# Patient Record
Sex: MALE | Race: WHITE | NOT HISPANIC OR LATINO | Employment: OTHER | URBAN - METROPOLITAN AREA
[De-identification: names, ages, dates, MRNs, and addresses within clinical notes are randomized per-mention and may not be internally consistent; named-entity substitution may affect disease eponyms.]

---

## 2017-03-21 ENCOUNTER — APPOINTMENT (OUTPATIENT)
Dept: BEHAVIORAL HEALTH | Facility: PHYSICIAN GROUP | Age: 62
End: 2017-03-21
Payer: COMMERCIAL

## 2017-03-22 RX ORDER — BUPROPION HYDROCHLORIDE 75 MG/1
TABLET ORAL
Qty: 180 TAB | Refills: 0 | Status: SHIPPED | OUTPATIENT
Start: 2017-03-22 | End: 2017-06-01 | Stop reason: SDUPTHER

## 2017-03-24 ENCOUNTER — OFFICE VISIT (OUTPATIENT)
Dept: BEHAVIORAL HEALTH | Facility: PHYSICIAN GROUP | Age: 62
End: 2017-03-24
Payer: COMMERCIAL

## 2017-03-24 VITALS
SYSTOLIC BLOOD PRESSURE: 150 MMHG | HEART RATE: 76 BPM | DIASTOLIC BLOOD PRESSURE: 105 MMHG | WEIGHT: 230 LBS | HEIGHT: 70 IN | BODY MASS INDEX: 32.93 KG/M2

## 2017-03-24 DIAGNOSIS — F33.42 MDD (MAJOR DEPRESSIVE DISORDER), RECURRENT, IN FULL REMISSION (HCC): ICD-10-CM

## 2017-03-24 DIAGNOSIS — F90.0 ADHD, PREDOMINANTLY INATTENTIVE TYPE: ICD-10-CM

## 2017-03-24 PROCEDURE — 99213 OFFICE O/P EST LOW 20 MIN: CPT | Performed by: PSYCHIATRY & NEUROLOGY

## 2017-03-24 RX ORDER — METHYLPHENIDATE HYDROCHLORIDE 20 MG/1
20 TABLET ORAL 3 TIMES DAILY
Qty: 90 TAB | Refills: 0 | Status: SHIPPED | OUTPATIENT
Start: 2017-03-24 | End: 2017-06-23 | Stop reason: SDUPTHER

## 2017-03-24 RX ORDER — METHYLPHENIDATE HYDROCHLORIDE 20 MG/1
20 TABLET ORAL 3 TIMES DAILY
Qty: 90 TAB | Refills: 0 | Status: SHIPPED | OUTPATIENT
Start: 2017-05-22 | End: 2017-06-23 | Stop reason: SDUPTHER

## 2017-03-24 RX ORDER — HYDROCODONE BITARTRATE AND ACETAMINOPHEN 5; 325 MG/1; MG/1
TABLET ORAL
Refills: 0 | COMMUNITY
Start: 2017-02-22 | End: 2017-06-23

## 2017-03-24 RX ORDER — BUPROPION HYDROCHLORIDE 75 MG/1
75 TABLET ORAL DAILY
Qty: 90 TAB | Refills: 0
Start: 2017-03-24 | End: 2017-06-01

## 2017-03-24 RX ORDER — METHYLPHENIDATE HYDROCHLORIDE 20 MG/1
20 TABLET ORAL 3 TIMES DAILY
Qty: 90 TAB | Refills: 0 | Status: SHIPPED | OUTPATIENT
Start: 2017-04-23 | End: 2017-06-23 | Stop reason: SDUPTHER

## 2017-03-24 RX ORDER — VENLAFAXINE HYDROCHLORIDE 75 MG/1
75 CAPSULE, EXTENDED RELEASE ORAL DAILY
Qty: 90 CAP | Refills: 0 | Status: SHIPPED | OUTPATIENT
Start: 2017-03-24 | End: 2017-06-23 | Stop reason: SDUPTHER

## 2017-03-24 NOTE — PROGRESS NOTES
PSYCHIATRY FOLLOW-UP NOTE      Chief Complaint   Patient presents with   • Follow-Up     ADHD, depression         History Of Present Illness:  Chris Garvin is a 61 y.o. old male with ADHD, major depressive disorder comes in today for follow up, was last seen 3 months ago. He reports doing good since his last visit here. He was in the ski accident and injured his left leg again. He was seen by his orthopedic physician and is working with him for his left fibular fracture. He also filed a workers comp case and has been off work since the middle of January because of this injury. He feels that because of this injury he is not being treated well at the job and he has filed a complaint with the HR department. He has been doing good in terms of his depressive symptoms. His dose of Wellbutrin was tapered down to 75 mg twice daily and he has not noticed a change in his symptoms. He feels that even with the lower dose he was able to handle the stressors around him in a pretty good way. Denies feeling irritable or agitated. His wife is also dealing with some physical health problems and he is trying to stay strong for her. Sleep and appetite good. Continues to be compliant with Ritalin and feels that it really helps his ability to focus and concentrate. Denies any other stressors at this time.    Social History:    x 2,  x 1.  to his second wife for 7 years. No biological kids but has 2 step kids. Lives in [place] with his wife. 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    Substance Use:  Denies recent use of alcohol, nicotine or illicit drugs. Has been sober from alcohol for 15 years.     Medications:  Current Outpatient Prescriptions   Medication Sig Dispense Refill   • methylphenidate (RITALIN) 20 MG tablet Take 1 Tab by mouth 3 times a day. 90 Tab 0   • [START ON 4/23/2017] methylphenidate (RITALIN) 20 MG tablet Take 1 Tab by mouth 3 times a day. 90 Tab 0   • [START  "ON 5/22/2017] methylphenidate (RITALIN) 20 MG tablet Take 1 Tab by mouth 3 times a day. 90 Tab 0   • buPROPion (WELLBUTRIN) 75 MG Tab Take 1 Tab by mouth every day. 90 Tab 0   • venlafaxine XR (EFFEXOR XR) 75 MG CAPSULE SR 24 HR Take 1 Cap by mouth every day. 90 Cap 0   • hydrocodone-acetaminophen (NORCO) 5-325 MG Tab per tablet TAKE 1 TABLET BY MOUTH EVERY 8 TO 12 HOURS AS NEEDED FOR PAIN  0   • Non Formulary Request Inhale 2 Puffs by mouth 2 Times a Day. Indications: Dulera (not carried by [facility])     • ibuprofen (MOTRIN) 800 MG TABS Take 1 Tab by mouth 3 times a day, with meals. 90 Tab 1   • lidocaine (LIDODERM) 5 % PTCH Apply 1 Patch to skin as directed See Admin Instructions. 30 Patch 1     No current facility-administered medications for this visit.       Review Of Systems:    Constitutional - Negative for fatigue  Respiratory - Negative for shortness of breath, cough  CVS - Negative for chest pain, palpitations  GI - Negative for nausea, vomiting, abdominal pain, diarrhea, constipation  Musculoskeletal - Positive for left leg pain  Neurological - Negative for headaches  Psychiatric - Negative for depression, anxiety    Physical Examination:  Vital signs: /105 mmHg  Pulse 76  Ht 1.778 m (5' 10\")  Wt 104.327 kg (230 lb)  BMI 33.00 kg/m2    Musculoskeletal: Limping gait. No abnormal movements.     Mental Status Evaluation:   General: Elderly white male, dressed in casual attire, good grooming and hygiene, in no apparent distress, calm and cooperative, good eye contact, no psychomotor agitation or retardation  Orientation: Alert and oriented to person, place and time  Recent and remote memory: Grossly intact  Attention span and concentration: Grossly intact  Speech: Spontaneous, normal rate, rhythm and tone  Thought Process: Linear, logical and goal directed  Thought Content: Denies suicidal or homicidal ideations, intent or plan  Perception: Denies auditory or visual hallucinations. No delusions " "noted  Associations: Intact  Language: Appropriate  Fund of knowledge and vocabulary: Grossly adequate  Mood: \"am good\"  Affect: Euthymic, mood congruent  Insight: Good  Judgment: Good      Impression:  1. ADHD, inattentive type  2. Major depressive disorder, recurrent, in full remission    Medical Records/Labs/Diagnostic Tests Reviewed:  [place] [facility] records - appropriate refills, no abuse suspected     Plan:  1. Decrease Wellbutrin to 75 mg in the morning for depression given stable mood symptoms. Plan is to eventually taper off Wellbutrin and continue him on only one anti depressant.  2. Continue Effexor XR 75mg qam for depression  3. Continue Ritalin 20mg tid for ADHD. Provided 3 scripts for 90 tabs each.  4. BP elevated again today. He has an appointment scheduled with his primary care physician for next month to discuss possible medications for hypertension.    Return to clinic in 3 months or sooner if symptoms worsen    The proposed treatment plan was discussed with the patient who was provided the opportunity to ask questions and make suggestions regarding alternative treatment. Patient verbalized understanding and expressed agreement with the plan.     Karo Romano M.D.  03/24/2017    This note was created using voice recognition software (Dragon). The accuracy of the dictation is limited by the abilities of the software. I have reviewed the note prior to signing, however some errors in grammar and context are still possible. If you have any questions related to this note please do not hesitate to contact our office.         "

## 2017-06-01 RX ORDER — BUPROPION HYDROCHLORIDE 75 MG/1
75 TABLET ORAL EVERY MORNING
Qty: 30 TAB | Refills: 0 | Status: SHIPPED | OUTPATIENT
Start: 2017-06-01 | End: 2017-06-23

## 2017-06-23 ENCOUNTER — OFFICE VISIT (OUTPATIENT)
Dept: BEHAVIORAL HEALTH | Facility: PHYSICIAN GROUP | Age: 62
End: 2017-06-23
Payer: COMMERCIAL

## 2017-06-23 VITALS
HEART RATE: 88 BPM | SYSTOLIC BLOOD PRESSURE: 131 MMHG | BODY MASS INDEX: 32.93 KG/M2 | WEIGHT: 230 LBS | DIASTOLIC BLOOD PRESSURE: 91 MMHG | HEIGHT: 70 IN

## 2017-06-23 DIAGNOSIS — F90.0 ADHD, PREDOMINANTLY INATTENTIVE TYPE: ICD-10-CM

## 2017-06-23 DIAGNOSIS — F33.42 MDD (MAJOR DEPRESSIVE DISORDER), RECURRENT, IN FULL REMISSION (HCC): ICD-10-CM

## 2017-06-23 PROCEDURE — 99213 OFFICE O/P EST LOW 20 MIN: CPT | Performed by: PSYCHIATRY & NEUROLOGY

## 2017-06-23 RX ORDER — VENLAFAXINE HYDROCHLORIDE 75 MG/1
75 CAPSULE, EXTENDED RELEASE ORAL DAILY
Qty: 90 CAP | Refills: 0 | Status: SHIPPED | OUTPATIENT
Start: 2017-06-23 | End: 2017-09-25 | Stop reason: SDUPTHER

## 2017-06-23 RX ORDER — METHYLPHENIDATE HYDROCHLORIDE 20 MG/1
20 TABLET ORAL 3 TIMES DAILY
Qty: 90 TAB | Refills: 0 | Status: SHIPPED | OUTPATIENT
Start: 2017-08-21 | End: 2017-09-19 | Stop reason: SDUPTHER

## 2017-06-23 RX ORDER — METHYLPHENIDATE HYDROCHLORIDE 20 MG/1
20 TABLET ORAL 3 TIMES DAILY
Qty: 90 TAB | Refills: 0 | Status: SHIPPED | OUTPATIENT
Start: 2017-07-22 | End: 2017-09-19

## 2017-06-23 RX ORDER — METHYLPHENIDATE HYDROCHLORIDE 20 MG/1
20 TABLET ORAL 3 TIMES DAILY
Qty: 90 TAB | Refills: 0 | Status: SHIPPED | OUTPATIENT
Start: 2017-06-23 | End: 2017-09-19

## 2017-06-23 RX ORDER — LISINOPRIL 5 MG/1
5 TABLET ORAL DAILY
Refills: 5 | COMMUNITY
Start: 2017-04-11 | End: 2017-09-25

## 2017-06-23 NOTE — PROGRESS NOTES
PSYCHIATRY FOLLOW-UP NOTE      Chief Complaint   Patient presents with   • Follow-Up     ADHD, depression         History Of Present Illness:  Chris Garvin is a 61 y.o. old male with ADHD, major depressive disorder comes in today for follow up, was last seen 3 months ago. He has been doing good since his last visit here. He was able to cut back on his dose of Wellbutrin to 75 mg and has not noticed any significant changes in his mood. He states that he was unable to get a refill on his Effexor for some reason and he was out of it for about 45 days and he definitely noticed a decline in his mood symptoms but he is back taking it again and feels good in regards to his depression. Continues to be compliant with Ritalin as well which she feels really helps his focus and energy. Sleep and appetite have been good. Denies anhedonia. He has noticed some increased irritability which could be because of changes in his work schedule. 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 Denies any new stressors. Denies any side effects from his current psychotropic medications. He also saw his primary care physician and was started on lisinopril for his hypertension but he stopped taking it a few weeks ago because of dizziness and cough. For now he is monitoring his blood pressure daily and has an appointment to discuss this further in a few weeks with his primary care physician.    Social History:    x 2,  x 1.  to his second wife for over 7 years. No biological kids but has 2 step kids. Lives in [place] with his wife. 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    Substance Use:  Denies recent use of alcohol, nicotine or illicit drugs. Has been sober from alcohol for 16+ years.     Medications:  Current Outpatient Prescriptions   Medication Sig Dispense Refill   • methylphenidate (RITALIN) 20 MG tablet Take 1 Tab by mouth 3  "times a day. 90 Tab 0   • [START ON 7/22/2017] methylphenidate (RITALIN) 20 MG tablet Take 1 Tab by mouth 3 times a day. 90 Tab 0   • [START ON 8/21/2017] methylphenidate (RITALIN) 20 MG tablet Take 1 Tab by mouth 3 times a day. 90 Tab 0   • venlafaxine XR (EFFEXOR XR) 75 MG CAPSULE SR 24 HR Take 1 Cap by mouth every day. 90 Cap 0   • lisinopril (PRINIVIL) 5 MG Tab Take 5 mg by mouth every day.  5   • Non Formulary Request Inhale 2 Puffs by mouth 2 Times a Day. Indications: Dulera (not carried by [facility])     • ibuprofen (MOTRIN) 800 MG TABS Take 1 Tab by mouth 3 times a day, with meals. 90 Tab 1   • lidocaine (LIDODERM) 5 % PTCH Apply 1 Patch to skin as directed See Admin Instructions. 30 Patch 1     No current facility-administered medications for this visit.       Review Of Systems:    Constitutional - Negative for fatigue  Respiratory - Negative for shortness of breath, cough  CVS - Negative for chest pain, palpitations  GI - Negative for nausea, vomiting, abdominal pain, diarrhea, constipation  Musculoskeletal - Negative for back pain  Neurological - Negative for headaches  Psychiatric - Negative for depression, anxiety    Physical Examination:  Vital signs: /91 mmHg  Pulse 88  Ht 1.778 m (5' 10\")  Wt 104.327 kg (230 lb)  BMI 33.00 kg/m2    Musculoskeletal: Limping gait. No abnormal movements.     Mental Status Evaluation:   General: Elderly white male, dressed in casual attire, good grooming and hygiene, in no apparent distress, calm and cooperative, good eye contact, no psychomotor agitation or retardation  Orientation: Alert and oriented to person, place and time  Recent and remote memory: Grossly intact  Attention span and concentration: Grossly intact  Speech: Spontaneous, normal rate, rhythm and tone  Thought Process: Linear, logical and goal directed  Thought Content: Denies suicidal or homicidal ideations, intent or plan  Perception: Denies auditory or visual hallucinations. No delusions " "noted  Associations: Intact  Language: Appropriate  Fund of knowledge and vocabulary: Grossly adequate  Mood: \"am good\"  Affect: Euthymic, mood congruent  Insight: Good  Judgment: Good      Impression:  1. ADHD, inattentive type  2. Major depressive disorder, recurrent, in full remission    Medical Records/Labs/Diagnostic Tests Reviewed:  [place]  records - appropriate refills, no abuse suspected     Plan:  1. Discontinue Wellbutrin given stable mood symptoms. Have asked him to give my clinical call back in a month to give a brief update on his mood symptoms without the Wellbutrin.  2. Continue Effexor XR 75 mg in the morning for depression  3. Continue Ritalin 20 mg three times daily for ADHD. Provided 3 scripts for 90 tabs each.    Return to clinic in 3 months or sooner if symptoms worsen    The proposed treatment plan was discussed with the patient who was provided the opportunity to ask questions and make suggestions regarding alternative treatment. Patient verbalized understanding and expressed agreement with the plan.     Karo Romano M.D.  06/23/2017    This note was created using voice recognition software (Dragon). The accuracy of the dictation is limited by the abilities of the software. I have reviewed the note prior to signing, however some errors in grammar and context are still possible. If you have any questions related to this note please do not hesitate to contact our office.         "

## 2017-09-19 RX ORDER — METHYLPHENIDATE HYDROCHLORIDE 20 MG/1
20 TABLET ORAL 3 TIMES DAILY
Qty: 90 TAB | Refills: 0 | Status: SHIPPED | OUTPATIENT
Start: 2017-09-19 | End: 2017-09-25 | Stop reason: SDUPTHER

## 2017-09-25 ENCOUNTER — OFFICE VISIT (OUTPATIENT)
Dept: BEHAVIORAL HEALTH | Facility: PHYSICIAN GROUP | Age: 62
End: 2017-09-25
Payer: COMMERCIAL

## 2017-09-25 VITALS
BODY MASS INDEX: 33.64 KG/M2 | HEIGHT: 70 IN | HEART RATE: 84 BPM | SYSTOLIC BLOOD PRESSURE: 126 MMHG | DIASTOLIC BLOOD PRESSURE: 92 MMHG | WEIGHT: 235 LBS

## 2017-09-25 DIAGNOSIS — F90.0 ADHD, PREDOMINANTLY INATTENTIVE TYPE: ICD-10-CM

## 2017-09-25 DIAGNOSIS — F33.42 MDD (MAJOR DEPRESSIVE DISORDER), RECURRENT, IN FULL REMISSION (HCC): ICD-10-CM

## 2017-09-25 PROCEDURE — 99214 OFFICE O/P EST MOD 30 MIN: CPT | Performed by: PSYCHIATRY & NEUROLOGY

## 2017-09-25 RX ORDER — METHYLPHENIDATE HYDROCHLORIDE 20 MG/1
20 TABLET ORAL 3 TIMES DAILY
Qty: 90 TAB | Refills: 0 | Status: SHIPPED | OUTPATIENT
Start: 2017-12-15 | End: 2018-01-22

## 2017-09-25 RX ORDER — METHYLPHENIDATE HYDROCHLORIDE 20 MG/1
20 TABLET ORAL 3 TIMES DAILY
Qty: 90 TAB | Refills: 0 | Status: SHIPPED | OUTPATIENT
Start: 2017-11-17 | End: 2018-01-22

## 2017-09-25 RX ORDER — PANTOPRAZOLE SODIUM 40 MG/1
40 TABLET, DELAYED RELEASE ORAL EVERY MORNING
Refills: 5 | COMMUNITY
Start: 2017-09-09

## 2017-09-25 RX ORDER — METHYLPHENIDATE HYDROCHLORIDE 20 MG/1
20 TABLET ORAL 3 TIMES DAILY
Qty: 90 TAB | Refills: 0 | Status: SHIPPED | OUTPATIENT
Start: 2017-10-19 | End: 2018-01-22 | Stop reason: SDUPTHER

## 2017-09-25 RX ORDER — VENLAFAXINE HYDROCHLORIDE 75 MG/1
75 CAPSULE, EXTENDED RELEASE ORAL DAILY
Qty: 90 CAP | Refills: 1 | Status: SHIPPED | OUTPATIENT
Start: 2017-09-25 | End: 2018-01-05 | Stop reason: SDUPTHER

## 2017-09-25 ASSESSMENT — PATIENT HEALTH QUESTIONNAIRE - PHQ9
7. TROUBLE CONCENTRATING ON THINGS, SUCH AS READING THE NEWSPAPER OR WATCHING TELEVISION: 0
5. POOR APPETITE OR OVEREATING: 0
9. THOUGHTS THAT YOU WOULD BE BETTER OFF DEAD, OR OF HURTING YOURSELF: 0
8. MOVING OR SPEAKING SO SLOWLY THAT OTHER PEOPLE COULD HAVE NOTICED. OR THE OPPOSITE, BEING SO FIGETY OR RESTLESS THAT YOU HAVE BEEN MOVING AROUND A LOT MORE THAN USUAL: 0
SUM OF ALL RESPONSES TO PHQ QUESTIONS 1-9: 0
1. LITTLE INTEREST OR PLEASURE IN DOING THINGS: 0
3. TROUBLE FALLING OR STAYING ASLEEP OR SLEEPING TOO MUCH: 0
4. FEELING TIRED OR HAVING LITTLE ENERGY: 0
6. FEELING BAD ABOUT YOURSELF - OR THAT YOU ARE A FAILURE OR HAVE LET YOURSELF OR YOUR FAMILY DOWN: 0
2. FEELING DOWN, DEPRESSED, IRRITABLE, OR HOPELESS: 0
SUM OF ALL RESPONSES TO PHQ9 QUESTIONS 1 AND 2: 0

## 2017-09-25 NOTE — PROGRESS NOTES
PSYCHIATRY FOLLOW-UP NOTE      Chief Complaint   Patient presents with   • Follow-Up     ADHD, depression         History Of Present Illness:  Chris Garvin is a 62 y.o. old male with ADHD, major depressive disorder, sleep apnea on ASHLEY comes in today for follow up, was last seen 3 months ago. He reports doing fine since his last visit here. He has been off Wellbutrin for about 3 months and has been doing good in regards to his depression. He has been compliant with his Effexor and is doing good on it. He has been having some occasional irritability secondary psychosocial stressors. He had been working at a local [employer] for the last 5 years but had some problems over there since his injury last winter. He felt that he was discriminated and plans on seeing a  to go over his options. He feels disappointed by the way he was treated by the HR department and his supervisor and does not want anybody else to go through the same. He has a meeting tomorrow with a  and is feeling anxious about it. He feels that he is doing the right thing and is not channeling his irritability and a negative sense. Denies any physical or verbal outbursts of anger. He continues to be physically active and has been working out 4-5 times a week. He is not sure what he plans on doing this winter but is looking into his options. He and his wife are stable financially so he's okay with not working this winter. Sleep and appetite have been good. Denies any side effects from Effexor or Ritalin. He has been taking his Ritalin 3 times a day and endorses benefit on his focus and concentration. Denies any thoughts of wanting to hurt himself or others.    Social History:    x 2,  x 1.  to his second wife for over 7 years. No biological kids but has 2 step kids. Lives in [place] with his wife. Retired  and works as a  at a local [employer].    Substance Use:  Alcohol - Denies, sober  "from alcohol for 16+ years  Nicotine - Denies  Illicit drugs - Denies    Medications:  Current Outpatient Prescriptions   Medication Sig Dispense Refill   • [START ON 10/19/2017] methylphenidate (RITALIN) 20 MG tablet Take 1 Tab by mouth 3 times a day. 90 Tab 0   • [START ON 11/17/2017] methylphenidate (RITALIN) 20 MG tablet Take 1 Tab by mouth 3 times a day. 90 Tab 0   • [START ON 12/15/2017] methylphenidate (RITALIN) 20 MG tablet Take 1 Tab by mouth 3 times a day. 90 Tab 0   • venlafaxine XR (EFFEXOR XR) 75 MG CAPSULE SR 24 HR Take 1 Cap by mouth every day. 90 Cap 1   • pantoprazole (PROTONIX) 40 MG Tablet Delayed Response Take 40 mg by mouth every morning.  5   • Non Formulary Request Inhale 2 Puffs by mouth 2 Times a Day. Indications: Dulera (not carried by [facility])     • ibuprofen (MOTRIN) 800 MG TABS Take 1 Tab by mouth 3 times a day, with meals. 90 Tab 1   • lidocaine (LIDODERM) 5 % PTCH Apply 1 Patch to skin as directed See Admin Instructions. 30 Patch 1     No current facility-administered medications for this visit.        Review Of Systems:    Constitutional - Negative for fatigue  Respiratory - Negative for shortness of breath, cough  CVS - Negative for chest pain, palpitations  GI - Negative for nausea, vomiting, abdominal pain, diarrhea, constipation  Musculoskeletal - Negative for back pain. Positive for left foot pain  Neurological - Negative for headaches  Psychiatric - Negative for depression, anxiety. Positive for occasional irritability     Physical Examination:  Vital signs: /92   Pulse 84   Ht 1.778 m (5' 10\")   Wt 106.6 kg (235 lb)   BMI 33.72 kg/m²     Musculoskeletal: Limping gait. No abnormal movements.     Mental Status Evaluation:   General: Elderly white male, dressed in casual attire, good grooming and hygiene, in no apparent distress, calm and cooperative, good eye contact, no psychomotor agitation or retardation  Orientation: Alert and oriented to person, place and " "time  Recent and remote memory: Grossly intact  Attention span and concentration: Grossly intact  Speech: Spontaneous, normal rate, rhythm and tone  Thought Process: Linear, logical and goal directed  Thought Content: Denies suicidal or homicidal ideations, intent or plan  Perception: Denies auditory or visual hallucinations. No delusions noted  Associations: Intact  Language: Appropriate  Fund of knowledge and vocabulary: Grossly adequate  Mood: \"am okay\"  Affect: Euthymic, mood congruent  Insight: Good  Judgment: Good      Impression:  1. ADHD, inattentive type  2. Major depressive disorder, recurrent, in full remission    Medical Records/Labs/Diagnostic Tests Reviewed:  [facility] records - appropriate refills, no abuse suspected     Plan:  1. Continue Effexor XR 75 mg in the morning for depression  2. Continue Ritalin 20 mg three times daily for ADHD. Provided 3 scripts for 90 tabs each.   - Weight stable   - BP and pulse stable    Return to clinic in 3 months or sooner if symptoms worsen    The proposed treatment plan was discussed with the patient who was provided the opportunity to ask questions and make suggestions regarding alternative treatment. Patient verbalized understanding and expressed agreement with the plan.     Total face-to-face time: 30 minutes  More than 50% of face-to-face time was spent in counseling and coordinating care. Discussed his recent increase in psychosocial stressors and ways to handle them.    Karo Romano M.D.  09/25/17    This note was created using voice recognition software (Dragon). The accuracy of the dictation is limited by the abilities of the software. I have reviewed the note prior to signing, however some errors in grammar and context are still possible. If you have any questions related to this note please do not hesitate to contact our office.         "

## 2018-01-22 DIAGNOSIS — F90.0 ADHD (ATTENTION DEFICIT HYPERACTIVITY DISORDER), INATTENTIVE TYPE: ICD-10-CM

## 2018-01-22 RX ORDER — METHYLPHENIDATE HYDROCHLORIDE 20 MG/1
20 TABLET ORAL 3 TIMES DAILY
Qty: 90 TAB | Refills: 0 | Status: SHIPPED | OUTPATIENT
Start: 2018-01-22 | End: 2018-02-09 | Stop reason: SDUPTHER

## 2018-02-09 ENCOUNTER — OFFICE VISIT (OUTPATIENT)
Dept: BEHAVIORAL HEALTH | Facility: PHYSICIAN GROUP | Age: 63
End: 2018-02-09
Payer: COMMERCIAL

## 2018-02-09 VITALS
WEIGHT: 235 LBS | HEIGHT: 70 IN | HEART RATE: 66 BPM | DIASTOLIC BLOOD PRESSURE: 87 MMHG | SYSTOLIC BLOOD PRESSURE: 116 MMHG | BODY MASS INDEX: 33.64 KG/M2

## 2018-02-09 DIAGNOSIS — F33.42 MDD (MAJOR DEPRESSIVE DISORDER), RECURRENT, IN FULL REMISSION (HCC): ICD-10-CM

## 2018-02-09 DIAGNOSIS — F90.0 ADHD (ATTENTION DEFICIT HYPERACTIVITY DISORDER), INATTENTIVE TYPE: ICD-10-CM

## 2018-02-09 PROCEDURE — 99213 OFFICE O/P EST LOW 20 MIN: CPT | Performed by: PSYCHIATRY & NEUROLOGY

## 2018-02-09 RX ORDER — METHYLPHENIDATE HYDROCHLORIDE 20 MG/1
20 TABLET ORAL 3 TIMES DAILY
Qty: 90 TAB | Refills: 0 | Status: SHIPPED | OUTPATIENT
Start: 2018-04-21 | End: 2018-05-14 | Stop reason: SDUPTHER

## 2018-02-09 RX ORDER — VENLAFAXINE HYDROCHLORIDE 75 MG/1
75 CAPSULE, EXTENDED RELEASE ORAL DAILY
Qty: 30 CAP | Refills: 2 | Status: SHIPPED | OUTPATIENT
Start: 2018-02-09 | End: 2018-04-03

## 2018-02-09 RX ORDER — METHYLPHENIDATE HYDROCHLORIDE 20 MG/1
20 TABLET ORAL 3 TIMES DAILY
Qty: 90 TAB | Refills: 0 | Status: SHIPPED | OUTPATIENT
Start: 2018-03-23 | End: 2018-04-22

## 2018-02-09 RX ORDER — METHYLPHENIDATE HYDROCHLORIDE 20 MG/1
20 TABLET ORAL 3 TIMES DAILY
Qty: 90 TAB | Refills: 0 | Status: SHIPPED | OUTPATIENT
Start: 2018-02-22 | End: 2018-03-24

## 2018-02-09 ASSESSMENT — PATIENT HEALTH QUESTIONNAIRE - PHQ9: CLINICAL INTERPRETATION OF PHQ2 SCORE: 0

## 2018-02-09 NOTE — PROGRESS NOTES
PSYCHIATRY FOLLOW-UP NOTE      Chief Complaint   Patient presents with   • Follow-Up     depression, ADHD         History Of Present Illness:  Chris Garvin is a 62 y.o. old male with ADHD, major depressive disorder, sleep apnea on ASHLEY comes in today for follow up, was last seen over 4 months ago. He reports doing good since his last visit here. He recently took a 10 day trip to [facility] with a friend and is looking back into going back there again in the summer. 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 He feels that the trip has touched him a lot and he was teary-eyed talking about it. He states that it gave him a completely different perspective of his life and the challenges of the people face. He is really excited about this mission and is wanting to go back into some good there. He states that on his flight to [place] [facility] he lost his luggage and did not have his Ritalin. He was able to get samples of Effexor from [facility] but felt that it was not a stronger dose. He has been back home for about 2 weeks and he has been compliant with his medications. He did struggle with his ADHD symptoms when he was not on Ritalin while he was in [place]. He was struggling with concentration issues and would often get distracted. He is currently denying any depressive symptoms. Denies any new psychosocial stressors. Sleep and appetite have been good. Denies any side effects that he is not a Effexor or Ritalin.    Social History:    x 2,  x 1.  to his second wife for over 7 years. No biological kids but has 2 step kids. Lives in [place] with his wife. Retired .    Substance Use:  Alcohol - Denies, sober from alcohol for 16+ years  Nicotine - Denies  Illicit drugs - Denies    Medications:  Current Outpatient Prescriptions   Medication Sig Dispense Refill   • [START ON 2/22/2018] methylphenidate  "(RITALIN) 20 MG tablet Take 1 Tab by mouth 3 times a day for 30 days. 90 Tab 0   • [START ON 3/23/2018] methylphenidate (RITALIN) 20 MG tablet Take 1 Tab by mouth 3 times a day for 30 days. 90 Tab 0   • [START ON 4/21/2018] methylphenidate (RITALIN) 20 MG tablet Take 1 Tab by mouth 3 times a day for 30 days. 90 Tab 0   • venlafaxine XR (EFFEXOR XR) 75 MG CAPSULE SR 24 HR Take 1 Cap by mouth every day. 30 Cap 2   • pantoprazole (PROTONIX) 40 MG Tablet Delayed Response Take 40 mg by mouth every morning.  5   • Non Formulary Request Inhale 2 Puffs by mouth 2 Times a Day. Indications: Dulera (not carried by [facility])     • ibuprofen (MOTRIN) 800 MG TABS Take 1 Tab by mouth 3 times a day, with meals. 90 Tab 1   • lidocaine (LIDODERM) 5 % PTCH Apply 1 Patch to skin as directed See Admin Instructions. 30 Patch 1     No current facility-administered medications for this visit.        Review Of Systems:    Constitutional - Negative for fatigue  Respiratory - Negative for shortness of breath, cough  CVS - Negative for chest pain, palpitations  GI - Negative for nausea, vomiting, abdominal pain, diarrhea, constipation  Musculoskeletal - Negative for back pain.  Neurological - Negative for headaches  Psychiatric - Negative for depression, anxiety.    Physical Examination:  Vital signs: /87   Pulse 66   Ht 1.778 m (5' 10\")   Wt 106.6 kg (235 lb)   BMI 33.72 kg/m²     Musculoskeletal: Limping gait. No abnormal movements.     Mental Status Evaluation:   General: Elderly white male, teary eyed few times, dressed in casual attire, good grooming and hygiene, in no apparent distress, calm and cooperative, good eye contact, no psychomotor agitation or retardation  Orientation: Alert and oriented to person, place and time  Recent and remote memory: Grossly intact  Attention span and concentration: Grossly intact  Speech: Spontaneous, normal rate, rhythm and tone  Thought Process: Linear, logical and goal directed  Thought " "Content: Denies suicidal or homicidal ideations, intent or plan  Perception: Denies auditory or visual hallucinations. No delusions noted  Associations: Intact  Language: Appropriate  Fund of knowledge and vocabulary: Grossly adequate  Mood: \"am okay\"  Affect: Euthymic, mood congruent  Insight: Good  Judgment: Good    Depression screening:  Depression Screen (PHQ-2/PHQ-9) 9/25/2017 2/9/2018   PHQ-2 Total Score 0 -   PHQ-2 Total Score - 0   PHQ-9 Total Score 0 -       Interpretation of PHQ-9 Total Score   Score Severity   1-4 No Depression   5-9 Mild Depression   10-14 Moderate Depression   15-19 Moderately Severe Depression   20-27 Severe Depression    Medical Records/Labs/Diagnostic Tests Reviewed:  [place]  records - appropriate refills, no abuse suspected       Impression:  1. ADHD, inattentive type  2. Major depressive disorder, recurrent, in full remission    Plan:  1. Continue Effexor XR 75 mg in the morning for depression  2. Continue Ritalin 20 mg three times daily for ADHD. Provided 3 scripts for 90 tabs each.   - Weight stable   - BP and pulse stable    Return to clinic in 3 months or sooner if symptoms worsen    The proposed treatment plan was discussed with the patient who was provided the opportunity to ask questions and make suggestions regarding alternative treatment. Patient verbalized understanding and expressed agreement with the plan.     Karo Romano M.D.  02/09/18    This note was created using voice recognition software (Dragon). The accuracy of the dictation is limited by the abilities of the software. I have reviewed the note prior to signing, however some errors in grammar and context are still possible. If you have any questions related to this note please do not hesitate to contact our office.         "

## 2018-05-14 ENCOUNTER — OFFICE VISIT (OUTPATIENT)
Dept: BEHAVIORAL HEALTH | Facility: PHYSICIAN GROUP | Age: 63
End: 2018-05-14
Payer: COMMERCIAL

## 2018-05-14 VITALS
HEIGHT: 70 IN | SYSTOLIC BLOOD PRESSURE: 109 MMHG | WEIGHT: 240 LBS | HEART RATE: 69 BPM | DIASTOLIC BLOOD PRESSURE: 69 MMHG | BODY MASS INDEX: 34.36 KG/M2

## 2018-05-14 DIAGNOSIS — F33.42 MDD (MAJOR DEPRESSIVE DISORDER), RECURRENT, IN FULL REMISSION (HCC): ICD-10-CM

## 2018-05-14 DIAGNOSIS — F90.0 ADHD (ATTENTION DEFICIT HYPERACTIVITY DISORDER), INATTENTIVE TYPE: ICD-10-CM

## 2018-05-14 PROCEDURE — 99213 OFFICE O/P EST LOW 20 MIN: CPT | Performed by: PSYCHIATRY & NEUROLOGY

## 2018-05-14 RX ORDER — VENLAFAXINE HYDROCHLORIDE 75 MG/1
75 CAPSULE, EXTENDED RELEASE ORAL EVERY MORNING
Qty: 30 CAP | Refills: 2 | Status: SHIPPED | OUTPATIENT
Start: 2018-05-14 | End: 2018-08-14 | Stop reason: SDUPTHER

## 2018-05-14 RX ORDER — METHYLPHENIDATE HYDROCHLORIDE 20 MG/1
20 TABLET ORAL 3 TIMES DAILY
Qty: 90 TAB | Refills: 0 | Status: SHIPPED | OUTPATIENT
Start: 2018-05-22 | End: 2018-06-21

## 2018-05-14 RX ORDER — METHYLPHENIDATE HYDROCHLORIDE 20 MG/1
20 TABLET ORAL 3 TIMES DAILY
Qty: 90 TAB | Refills: 0 | Status: SHIPPED | OUTPATIENT
Start: 2018-06-20 | End: 2018-07-20

## 2018-05-14 RX ORDER — METHYLPHENIDATE HYDROCHLORIDE 20 MG/1
20 TABLET ORAL 3 TIMES DAILY
Qty: 90 TAB | Refills: 0 | Status: SHIPPED | OUTPATIENT
Start: 2018-07-19 | End: 2018-08-13 | Stop reason: SDUPTHER

## 2018-05-14 NOTE — PROGRESS NOTES
PSYCHIATRY FOLLOW-UP NOTE      Chief Complaint   Patient presents with   • Follow-Up     ADHD, depression         History Of Present Illness:  Chris Garvin is a 62 y.o. old male with ADHD, major depressive disorder, sleep apnea on ASHLEY comes in today for follow up, was last seen 3 months ago. He has been doing good in regards to his depression and ADHD since last visit with me. He noticed a small dip in his mood when he lost his high school friend from pancreatic cancer. However, it appeared to be situational and he started feeling better the next few weeks. He has been compliant with his Effexor and endorses benefit. He has been a lot more involved with his [employer] and has been leading a grief group over there which he really enjoys. He has been working on his spiritual journey and is happy to have that support in his life. His wife has been doing better in regards to her health as well and denies any current medical stressors. Continues to be compliant with his Ritalin which has been helping his impaired focus. Denies any current impairments because of uncontrolled ADHD or depression. Sleep and appetite continue to be stable. Denies any new psychosocial stressors. Denies struggling with anger or irritability. Denies having thoughts of wanting to hurt himself or others.    Social History:    x 2,  x 1.  to his second wife for over 7 years. No biological kids but has 2 step kids. Lives in [place] with his wife. Retired .    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 90 Tab 0   • [START ON 6/20/2018] methylphenidate (RITALIN) 20 MG tablet Take 1 Tab by mouth 3 times a day for 30 days. 90 Tab 0   • [START ON 7/19/2018]  "methylphenidate (RITALIN) 20 MG tablet Take 1 Tab by mouth 3 times a day for 30 days. 90 Tab 0   • venlafaxine XR (EFFEXOR XR) 75 MG CAPSULE SR 24 HR Take 1 Cap by mouth every morning. 30 Cap 2   • pantoprazole (PROTONIX) 40 MG Tablet Delayed Response Take 40 mg by mouth every morning.  5   • ibuprofen (MOTRIN) 800 MG TABS Take 1 Tab by mouth 3 times a day, with meals. 90 Tab 1     No current facility-administered medications for this visit.        Review Of Systems:    Constitutional - Negative for fatigue  Respiratory - Negative for shortness of breath, cough  CVS - Negative for chest pain, palpitations  GI - Negative for nausea, vomiting, abdominal pain, diarrhea, constipation  Musculoskeletal - Negative for back pain.  Neurological - Negative for headaches  Psychiatric - Negative for depression, anxiety    Physical Examination:  Vital signs: /69   Pulse 69   Ht 1.778 m (5' 10\")   Wt 108.9 kg (240 lb)   BMI 34.44 kg/m²     Musculoskeletal: Limping gait. No abnormal movements.     Mental Status Evaluation:   General: Elderly white male, teary eyed few times, dressed in casual attire, good grooming and hygiene, in no apparent distress, calm and cooperative, good eye contact, no psychomotor agitation or retardation  Orientation: Alert and oriented to person, place and time  Recent and remote memory: Grossly intact  Attention span and concentration: Grossly intact  Speech: Spontaneous, normal rate, rhythm and tone  Thought Process: Linear, logical and goal directed  Thought Content: Denies suicidal or homicidal ideations, intent or plan  Perception: Denies auditory or visual hallucinations. No delusions noted  Associations: Intact  Language: Appropriate  Fund of knowledge and vocabulary: Grossly adequate  Mood: \"am okay\"  Affect: Euthymic, mood congruent  Insight: Good  Judgment: Good    Depression screening:  Depression Screen (PHQ-2/PHQ-9) 9/25/2017 2/9/2018   PHQ-2 Total Score 0 -   PHQ-2 Total Score - 0 "   PHQ-9 Total Score 0 -       Interpretation of PHQ-9 Total Score   Score Severity   1-4 No Depression   5-9 Mild Depression   10-14 Moderate Depression   15-19 Moderately Severe Depression   20-27 Severe Depression    Medical Records/Labs/Diagnostic Tests Reviewed:  [place]  records - appropriate refills, no abuse suspected       Impression:  1. ADHD, inattentive type - stable   2. Major depressive disorder, recurrent, in full remission - stable    Plan:  1. Continue Effexor XR 75 mg in the morning for depression  2. Continue Ritalin 20 mg three times daily for ADHD. Provided 3 scripts for 90 tabs each.   - Weight stable   - BP and pulse stable    Return to clinic in 3 months or sooner if symptoms worsen    The proposed treatment plan was discussed with the patient who was provided the opportunity to ask questions and make suggestions regarding alternative treatment. Patient verbalized understanding and expressed agreement with the plan.     Karo Romano M.D.  05/14/18    This note was created using voice recognition software (Dragon). The accuracy of the dictation is limited by the abilities of the software. I have reviewed the note prior to signing, however some errors in grammar and context are still possible. If you have any questions related to this note please do not hesitate to contact our office.

## 2018-08-14 ENCOUNTER — OFFICE VISIT (OUTPATIENT)
Dept: BEHAVIORAL HEALTH | Facility: PHYSICIAN GROUP | Age: 63
End: 2018-08-14
Payer: COMMERCIAL

## 2018-08-14 VITALS
HEIGHT: 70 IN | SYSTOLIC BLOOD PRESSURE: 118 MMHG | HEART RATE: 77 BPM | WEIGHT: 229 LBS | BODY MASS INDEX: 32.78 KG/M2 | DIASTOLIC BLOOD PRESSURE: 87 MMHG

## 2018-08-14 DIAGNOSIS — F90.0 ADHD, PREDOMINANTLY INATTENTIVE TYPE: ICD-10-CM

## 2018-08-14 DIAGNOSIS — F33.42 MDD (MAJOR DEPRESSIVE DISORDER), RECURRENT, IN FULL REMISSION (HCC): ICD-10-CM

## 2018-08-14 PROCEDURE — 99213 OFFICE O/P EST LOW 20 MIN: CPT | Performed by: PSYCHIATRY & NEUROLOGY

## 2018-08-14 RX ORDER — METHYLPHENIDATE HYDROCHLORIDE 20 MG/1
20 TABLET ORAL 3 TIMES DAILY
Qty: 90 TAB | Refills: 0 | Status: SHIPPED | OUTPATIENT
Start: 2018-09-18 | End: 2018-10-18

## 2018-08-14 RX ORDER — METHYLPHENIDATE HYDROCHLORIDE 20 MG/1
20 TABLET ORAL 3 TIMES DAILY
Qty: 90 TAB | Refills: 0 | Status: SHIPPED | OUTPATIENT
Start: 2018-08-20 | End: 2018-09-19

## 2018-08-14 RX ORDER — CLOBETASOL PROPIONATE 0.5 MG/G
OINTMENT TOPICAL
Refills: 1 | COMMUNITY
Start: 2018-07-18 | End: 2019-08-16

## 2018-08-14 RX ORDER — METHYLPHENIDATE HYDROCHLORIDE 20 MG/1
20 TABLET ORAL 3 TIMES DAILY
Qty: 90 TAB | Refills: 0 | Status: SHIPPED | OUTPATIENT
Start: 2018-10-17 | End: 2018-11-15 | Stop reason: SDUPTHER

## 2018-08-14 RX ORDER — VENLAFAXINE HYDROCHLORIDE 75 MG/1
75 CAPSULE, EXTENDED RELEASE ORAL EVERY MORNING
Qty: 90 CAP | Refills: 0 | Status: SHIPPED | OUTPATIENT
Start: 2018-08-14 | End: 2018-11-15 | Stop reason: SDUPTHER

## 2018-08-14 NOTE — PROGRESS NOTES
PSYCHIATRY FOLLOW-UP NOTE      Chief Complaint   Patient presents with   • Follow-Up     ADHD, depression         History Of Present Illness:  Chris Garvin is a 62 y.o. old male with ADHD, major depressive disorder, sleep apnea on ASHLEY comes in today for follow up, was last seen 3 months ago.  He reports doing good since his last visit here.  He has been compliant with his Effexor and endorses benefit on his mood.  He denies any current active symptoms of depression.  Sleep and appetite have been good.  He has been following a diet plan and has lost over 10 pounds and is feeling good about it.  Denies anhedonia, continues to enjoy spending time with his wife and being socially active.  He has been really involved with his [employer] and trying to be a better person and is working on his patient's around other people.  He has been seeing a therapist at [facility] regarding his previous trauma as a  and is doing good in therapy.  He continues to do good in regards to his concentration with Ritalin.  He has been compliant with it and currently denying any active symptoms of ADHD.  Denies any side effects that he has noticed from Effexor or Ritalin.    Social History:   He has been  ×2 and  ×1.  He lives with his wife in [place].  He does not have any biological kids but has to step kids from his wife.  He is a retired .      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 90 Tab 0   • [START ON 9/18/2018] methylphenidate (RITALIN) 20 MG tablet Take 1 Tab by mouth 3 times a day for 30 days. 90 Tab 0   • [START ON 10/17/2018] methylphenidate (RITALIN) 20 MG tablet Take 1 Tab  "by mouth 3 times a day for 30 days. 90 Tab 0   • PROAIR  (90 Base) MCG/ACT Aero Soln inhalation aerosol Inhale 2 Puffs by mouth 4 times a day.  11   • venlafaxine XR (EFFEXOR XR) 75 MG CAPSULE SR 24 HR Take 1 Cap by mouth every morning. 90 Cap 0   • clobetasol (TEMOVATE) 0.05 % Ointment APPLY TO AFFECTED AREA TWICE A DAY (AVOID FACE, GROIN,AXILLA)  1   • pantoprazole (PROTONIX) 40 MG Tablet Delayed Response Take 40 mg by mouth every morning.  5   • ibuprofen (MOTRIN) 800 MG TABS Take 1 Tab by mouth 3 times a day, with meals. 90 Tab 1     No current facility-administered medications for this visit.        Review Of Systems:    Constitutional - Negative for fatigue  Respiratory - Negative for shortness of breath, cough  CVS - Negative for chest pain, palpitations  GI - Negative for nausea, vomiting, abdominal pain, diarrhea, constipation  Musculoskeletal - Negative for back pain.  Neurological - Negative for headaches  Psychiatric - Negative for depression, anxiety    Physical Examination:  Vital signs: /87   Pulse 77   Ht 1.778 m (5' 10\")   Wt 103.9 kg (229 lb)   BMI 32.86 kg/m²     Musculoskeletal: Limping gait. No abnormal movements.     Mental Status Evaluation:   General: Elderly white male, teary eyed few times, dressed in casual attire, good grooming and hygiene, in no apparent distress, calm and cooperative, good eye contact, no psychomotor agitation or retardation  Orientation: Alert and oriented to person, place and time  Recent and remote memory: Grossly intact  Attention span and concentration: Grossly intact  Speech: Spontaneous, normal rate, rhythm and tone  Thought Process: Linear, logical and goal directed  Thought Content: Denies suicidal or homicidal ideations, intent or plan  Perception: Denies auditory or visual hallucinations. No delusions noted  Associations: Intact  Language: Appropriate  Fund of knowledge and vocabulary: Grossly adequate  Mood: \"am doing good\"  Affect: " Euthymic, mood congruent  Insight: Good  Judgment: Good    Depression screening:  Depression Screen (PHQ-2/PHQ-9) 9/25/2017 2/9/2018   PHQ-2 Total Score 0 -   PHQ-2 Total Score - 0   PHQ-9 Total Score 0 -     Interpretation of PHQ-9 Total Score   Score Severity   1-4 No Depression   5-9 Mild Depression   10-14 Moderate Depression   15-19 Moderately Severe Depression   20-27 Severe Depression    Medical Records/Labs/Diagnostic Tests Reviewed:  [place]  records - appropriate refills, no abuse suspected       Impression:  1. ADHD, inattentive type - stable   2. Major depressive disorder, recurrent, in full remission - stable    Plan:  1. Continue Effexor XR 75 mg in the morning for depression  2. Continue Ritalin 20 mg three times daily for ADHD. Provided 3 scripts for 90 tabs each.   - Weight loss of 11 lbs in 3 months (intentional)    - BP and pulse stable    Return to clinic in 3 months or sooner if symptoms worsen    The proposed treatment plan was discussed with the patient who was provided the opportunity to ask questions and make suggestions regarding alternative treatment. Patient verbalized understanding and expressed agreement with the plan.     Karo Romano M.D.  08/14/18    This note was created using voice recognition software (Dragon). The accuracy of the dictation is limited by the abilities of the software. I have reviewed the note prior to signing, however some errors in grammar and context are still possible. If you have any questions related to this note please do not hesitate to contact our office.

## 2018-11-15 ENCOUNTER — OFFICE VISIT (OUTPATIENT)
Dept: BEHAVIORAL HEALTH | Facility: CLINIC | Age: 63
End: 2018-11-15
Payer: COMMERCIAL

## 2018-11-15 VITALS
HEIGHT: 70 IN | HEART RATE: 66 BPM | SYSTOLIC BLOOD PRESSURE: 145 MMHG | DIASTOLIC BLOOD PRESSURE: 98 MMHG | WEIGHT: 230 LBS | BODY MASS INDEX: 32.93 KG/M2

## 2018-11-15 DIAGNOSIS — F33.8 SEASONAL AFFECTIVE DISORDER (HCC): ICD-10-CM

## 2018-11-15 DIAGNOSIS — F90.0 ADHD, PREDOMINANTLY INATTENTIVE TYPE: ICD-10-CM

## 2018-11-15 DIAGNOSIS — F33.42 MDD (MAJOR DEPRESSIVE DISORDER), RECURRENT, IN FULL REMISSION (HCC): ICD-10-CM

## 2018-11-15 PROCEDURE — 99214 OFFICE O/P EST MOD 30 MIN: CPT | Performed by: PSYCHIATRY & NEUROLOGY

## 2018-11-15 RX ORDER — METHYLPHENIDATE HYDROCHLORIDE 20 MG/1
20 TABLET ORAL 3 TIMES DAILY
Qty: 90 TAB | Refills: 0 | Status: SHIPPED | OUTPATIENT
Start: 2018-12-16 | End: 2019-01-15

## 2018-11-15 RX ORDER — VENLAFAXINE HYDROCHLORIDE 75 MG/1
75 CAPSULE, EXTENDED RELEASE ORAL EVERY MORNING
Qty: 90 CAP | Refills: 0 | Status: SHIPPED | OUTPATIENT
Start: 2018-11-15 | End: 2019-02-15 | Stop reason: SDUPTHER

## 2018-11-15 RX ORDER — METHYLPHENIDATE HYDROCHLORIDE 20 MG/1
20 TABLET ORAL 3 TIMES DAILY
Qty: 90 TAB | Refills: 0 | Status: SHIPPED | OUTPATIENT
Start: 2019-01-14 | End: 2019-02-13

## 2018-11-15 RX ORDER — FLUOROURACIL 50 MG/G
CREAM TOPICAL
Refills: 0 | COMMUNITY
Start: 2018-08-30 | End: 2019-08-16

## 2018-11-15 RX ORDER — METHYLPHENIDATE HYDROCHLORIDE 20 MG/1
20 TABLET ORAL 3 TIMES DAILY
Qty: 90 TAB | Refills: 0 | Status: SHIPPED | OUTPATIENT
Start: 2018-11-17 | End: 2018-12-17

## 2018-11-15 NOTE — PROGRESS NOTES
PSYCHIATRY FOLLOW-UP NOTE      Chief Complaint   Patient presents with   • Follow-Up     ADHD, depression         History Of Present Illness:  Chris Garvin is a 63 y.o. old male with ADHD, major depressive disorder, sleep apnea on ASHLEY comes in today for follow up, was last seen 3 months ago.  He reports doing all right since his last visit here.  He has noticed that with the time and season change he is sleeping more and is feeling more tired.  He does not think that he is depressed but does feel more fatigued.  He continues to be active in his [employer] which she really enjoys.  He denies any relationship or psychosocial stressors.  He has been compliant with Effexor and endorses benefit.  He continues to do good in regards to his ADHD with Ritalin.  He denies any problems with his concentration or focus at this time.  Appetite has been good.  He has noticed that he is sleeping approximately 11 hours and has been using his CPAP machine.  Denies feeling hopeless or helpless or struggling with anger or irritability.  Denies having thoughts of wanting to hurt himself or others    Social History:   He has been  ×2 and  ×1.  He lives with his wife in [place].  He does not have any biological kids but has 2 step kids from his wife.  He is a retired .      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 90 Tab 0   • [START ON 12/16/2018] methylphenidate (RITALIN) 20 MG tablet Take 1 Tab by mouth 3 times a day for 30 days. 90 Tab 0   • [START ON 1/14/2019] methylphenidate (RITALIN) 20 MG tablet Take 1 Tab by mouth 3 times a day for 30 days. 90 Tab 0   • venlafaxine XR (EFFEXOR XR) 75 MG  "CAPSULE SR 24 HR Take 1 Cap by mouth every morning. 90 Cap 0   • fluorouracil (EFUDEX) 5 % cream APPLY TO AFFECTED AREA TWICE A DAY FOR 2 WEEKS (AVOID EYE AREA)  0   • PROAIR  (90 Base) MCG/ACT Aero Soln inhalation aerosol Inhale 2 Puffs by mouth 4 times a day.  11   • clobetasol (TEMOVATE) 0.05 % Ointment APPLY TO AFFECTED AREA TWICE A DAY (AVOID FACE, GROIN,AXILLA)  1   • pantoprazole (PROTONIX) 40 MG Tablet Delayed Response Take 40 mg by mouth every morning.  5   • ibuprofen (MOTRIN) 800 MG TABS Take 1 Tab by mouth 3 times a day, with meals. 90 Tab 1     No current facility-administered medications for this visit.        Review Of Systems:    Constitutional - Positive for fatigue  Respiratory - Negative for shortness of breath, cough  CVS - Negative for chest pain, palpitations  GI - Negative for nausea, vomiting, abdominal pain, diarrhea, constipation  Musculoskeletal - Negative for back pain.  Neurological - Negative for headaches  Psychiatric - Negative for depression, anxiety. Positive for excessive sleep    Physical Examination:  Vital signs: /98   Pulse 66   Ht 1.778 m (5' 10\")   Wt 104.3 kg (230 lb)   BMI 33.00 kg/m²     Musculoskeletal: Limping gait. No abnormal movements.     Mental Status Evaluation:   General: Elderly white male, dressed in casual attire, good grooming and hygiene, in no apparent distress, calm and cooperative, good eye contact, no psychomotor agitation or retardation  Orientation: Alert and oriented to person, place and time  Recent and remote memory: Grossly intact  Attention span and concentration: Grossly intact  Speech: Spontaneous, normal rate, rhythm and tone  Thought Process: Linear, logical and goal directed  Thought Content: Denies suicidal or homicidal ideations, intent or plan  Perception: Denies auditory or visual hallucinations. No delusions noted  Associations: Intact  Language: Appropriate  Fund of knowledge and vocabulary: Grossly adequate  Mood: \"not " "bad\"  Affect: Euthymic, mood congruent  Insight: Good  Judgment: Good    Depression screening:  Depression Screen (PHQ-2/PHQ-9) 9/25/2017 2/9/2018   PHQ-2 Total Score 0 -   PHQ-2 Total Score - 0   PHQ-9 Total Score 0 -     Interpretation of PHQ-9 Total Score   Score Severity   1-4 No Depression   5-9 Mild Depression   10-14 Moderate Depression   15-19 Moderately Severe Depression   20-27 Severe Depression    Medical Records/Labs/Diagnostic Tests Reviewed:  [place]  records - appropriate refills, no abuse suspected       Impression:  1. ADHD, inattentive type - stable   2. Major depressive disorder, recurrent, in full remission - stable  3. Seasonal affective disorder - new problem    Plan:  1. Continue Effexor XR 75 mg in the morning for depression  2. Continue Ritalin 20 mg three times daily for ADHD. Provided 3 scripts for 90 tabs each.   - Weight gain of 1 lbs in 3 months   - BP elevated today, will continue to monitor   - Pulse normal  3. Start light therapy 10,000 lux for 30 minutes daily.  Discussed side effects including headaches.  Have advised him to set up an alarm to wake up after 8 hours and to use light therapy which should help him with energy and seasonal depression.    Return to clinic in 3 months or sooner if symptoms worsen    The proposed treatment plan was discussed with the patient who was provided the opportunity to ask questions and make suggestions regarding alternative treatment. Patient verbalized understanding and expressed agreement with the plan.     Karo Romano M.D.  11/15/18    This note was created using voice recognition software (Dragon). The accuracy of the dictation is limited by the abilities of the software. I have reviewed the note prior to signing, however some errors in grammar and context are still possible. If you have any questions related to this note please do not hesitate to contact our office.   "

## 2019-02-15 ENCOUNTER — OFFICE VISIT (OUTPATIENT)
Dept: BEHAVIORAL HEALTH | Facility: CLINIC | Age: 64
End: 2019-02-15
Payer: COMMERCIAL

## 2019-02-15 VITALS
BODY MASS INDEX: 33.93 KG/M2 | SYSTOLIC BLOOD PRESSURE: 142 MMHG | DIASTOLIC BLOOD PRESSURE: 82 MMHG | WEIGHT: 237 LBS | HEIGHT: 70 IN | HEART RATE: 88 BPM

## 2019-02-15 DIAGNOSIS — F33.8 SEASONAL AFFECTIVE DISORDER (HCC): ICD-10-CM

## 2019-02-15 DIAGNOSIS — F33.42 MDD (MAJOR DEPRESSIVE DISORDER), RECURRENT, IN FULL REMISSION (HCC): ICD-10-CM

## 2019-02-15 DIAGNOSIS — F90.0 ADHD, PREDOMINANTLY INATTENTIVE TYPE: ICD-10-CM

## 2019-02-15 PROCEDURE — 99214 OFFICE O/P EST MOD 30 MIN: CPT | Performed by: PSYCHIATRY & NEUROLOGY

## 2019-02-15 RX ORDER — METHYLPHENIDATE HYDROCHLORIDE 20 MG/1
20 TABLET ORAL 3 TIMES DAILY
Qty: 90 TAB | Refills: 0 | Status: SHIPPED | OUTPATIENT
Start: 2019-03-16 | End: 2019-04-15

## 2019-02-15 RX ORDER — VENLAFAXINE HYDROCHLORIDE 75 MG/1
75 CAPSULE, EXTENDED RELEASE ORAL EVERY MORNING
Qty: 90 CAP | Refills: 0 | Status: SHIPPED | OUTPATIENT
Start: 2019-02-15 | End: 2019-05-16 | Stop reason: SDUPTHER

## 2019-02-15 RX ORDER — METHYLPHENIDATE HYDROCHLORIDE 20 MG/1
20 TABLET ORAL 3 TIMES DAILY
Qty: 90 TAB | Refills: 0 | Status: SHIPPED | OUTPATIENT
Start: 2019-04-14 | End: 2019-05-14

## 2019-02-15 RX ORDER — METHYLPHENIDATE HYDROCHLORIDE 20 MG/1
20 TABLET ORAL 3 TIMES DAILY
Qty: 90 TAB | Refills: 0 | Status: SHIPPED | OUTPATIENT
Start: 2019-02-15 | End: 2019-03-17

## 2019-02-15 RX ORDER — DICYCLOMINE HYDROCHLORIDE 10 MG/1
10 CAPSULE ORAL EVERY 6 HOURS PRN
Refills: 2 | COMMUNITY
Start: 2019-01-12

## 2019-02-15 NOTE — PROGRESS NOTES
PSYCHIATRY FOLLOW-UP NOTE      Chief Complaint   Patient presents with   • Follow-Up     depression, ADHD         History Of Present Illness:  Chris Garvin is a 63 y.o. old male with ADHD, major depressive disorder, sleep apnea on ASHLEY, Barrettes esophagus, IBS, GERD comes in today for follow up, was last seen 3 months ago.  He reports doing good in regards to his depression and ADHD since his last visit with me.  He bought a light box and has been compliant with it and has noticed an improvement in his mood and is sleeping less.  He continues to be compliant with his Effexor and Ritalin and endorses benefit.  He denies any current impairments in regards to his focus or concentration.  He denies any new psychosocial stressors.  He denies having thoughts of wanting to hurt himself or others.    Social History:   He has been  ×2 and  ×1.  He lives with his wife in [place].  He does not have any biological kids but has 2 step kids from his wife.  He is a retired .      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  5   • dicyclomine (BENTYL) 10 MG Cap Take 10 mg by mouth every 6 hours as needed.  2   • venlafaxine XR (EFFEXOR XR) 75 MG CAPSULE SR 24 HR Take 1 Cap by mouth every morning. 90 Cap 0   • fluorouracil (EFUDEX) 5 % cream APPLY TO AFFECTED AREA TWICE A DAY FOR 2 WEEKS (AVOID EYE AREA)  0   • PROAIR  (90 Base) MCG/ACT Aero Soln inhalation aerosol Inhale 2 Puffs by mouth 4 times a day.  11   • clobetasol (TEMOVATE) 0.05 % Ointment APPLY TO AFFECTED AREA TWICE A DAY (AVOID FACE, GROIN,AXILLA)  1   • ibuprofen (MOTRIN) 800 MG TABS Take 1 Tab by mouth 3 times a day, with meals. 90 Tab 1  "    No current facility-administered medications for this visit.        Review Of Systems:    Constitutional - Positive for fatigue  Respiratory - Negative for shortness of breath, cough  CVS - Negative for chest pain, palpitations  GI - Negative for nausea, vomiting, abdominal pain, diarrhea, constipation  Musculoskeletal - Negative for back pain.  Neurological - Negative for headaches  Psychiatric - Negative for depression, anxiety, sleep problems    Physical Examination:  Vital signs: /82   Pulse 88   Ht 1.778 m (5' 10\")   Wt 107.5 kg (237 lb)   BMI 34.01 kg/m²     Musculoskeletal: Limping gait. No abnormal movements.     Mental Status Evaluation:   General: Elderly white male, dressed in casual attire, good grooming and hygiene, in no apparent distress, calm and cooperative, good eye contact, no psychomotor agitation or retardation  Orientation: Alert and oriented to person, place and time  Recent and remote memory: Grossly intact  Attention span and concentration: Grossly intact  Speech: Spontaneous, normal rate, rhythm and tone  Thought Process: Linear, logical and goal directed  Thought Content: Denies suicidal or homicidal ideations, intent or plan  Perception: Denies auditory or visual hallucinations. No delusions noted  Associations: Intact  Language: Appropriate  Fund of knowledge and vocabulary: Grossly adequate  Mood: \"good\"  Affect: Euthymic, mood congruent  Insight: Good  Judgment: Good    Depression screening:  Depression Screen (PHQ-2/PHQ-9) 9/25/2017 2/9/2018   PHQ-2 Total Score 0 -   PHQ-2 Total Score - 0   PHQ-9 Total Score 0 -     Interpretation of PHQ-9 Total Score   Score Severity   1-4 No Depression   5-9 Mild Depression   10-14 Moderate Depression   15-19 Moderately Severe Depression   20-27 Severe Depression    Medical Records/Labs/Diagnostic Tests Reviewed:  [place]  records - appropriate refills, no abuse suspected       Impression:  1. ADHD, inattentive type - stable   2. Major " depressive disorder, recurrent, in full remission - stable  3. Seasonal affective disorder - stable    Plan:  1. Continue Effexor XR 75 mg in the morning for depression  2. Continue Ritalin 20 mg three times daily for ADHD  3. Continue light therapy 10,000 lux for 30 minutes daily    Return to clinic in 3 months or sooner if symptoms worsen    The proposed treatment plan was discussed with the patient who was provided the opportunity to ask questions and make suggestions regarding alternative treatment. Patient verbalized understanding and expressed agreement with the plan.     Karo Romano M.D.  02/15/19    This note was created using voice recognition software (Dragon). The accuracy of the dictation is limited by the abilities of the software. I have reviewed the note prior to signing, however some errors in grammar and context are still possible. If you have any questions related to this note please do not hesitate to contact our office.

## 2019-05-16 ENCOUNTER — OFFICE VISIT (OUTPATIENT)
Dept: BEHAVIORAL HEALTH | Facility: CLINIC | Age: 64
End: 2019-05-16
Payer: COMMERCIAL

## 2019-05-16 VITALS
HEART RATE: 81 BPM | WEIGHT: 235 LBS | BODY MASS INDEX: 33.64 KG/M2 | HEIGHT: 70 IN | SYSTOLIC BLOOD PRESSURE: 131 MMHG | DIASTOLIC BLOOD PRESSURE: 84 MMHG

## 2019-05-16 DIAGNOSIS — F90.0 ADHD, PREDOMINANTLY INATTENTIVE TYPE: ICD-10-CM

## 2019-05-16 DIAGNOSIS — F33.42 MDD (MAJOR DEPRESSIVE DISORDER), RECURRENT, IN FULL REMISSION (HCC): ICD-10-CM

## 2019-05-16 PROBLEM — F33.8 SEASONAL AFFECTIVE DISORDER (HCC): Status: RESOLVED | Noted: 2018-11-15 | Resolved: 2019-05-16

## 2019-05-16 PROCEDURE — 99213 OFFICE O/P EST LOW 20 MIN: CPT | Performed by: PSYCHIATRY & NEUROLOGY

## 2019-05-16 RX ORDER — METHYLPHENIDATE HYDROCHLORIDE 20 MG/1
20 TABLET ORAL 3 TIMES DAILY
Qty: 90 TAB | Refills: 0 | Status: SHIPPED | OUTPATIENT
Start: 2019-05-16 | End: 2019-08-13 | Stop reason: SDUPTHER

## 2019-05-16 RX ORDER — METHYLPHENIDATE HYDROCHLORIDE 20 MG/1
20 TABLET ORAL 3 TIMES DAILY
Qty: 90 TAB | Refills: 0 | Status: SHIPPED | OUTPATIENT
Start: 2019-07-13 | End: 2019-08-12

## 2019-05-16 RX ORDER — METHYLPHENIDATE HYDROCHLORIDE 20 MG/1
20 TABLET ORAL 3 TIMES DAILY
Qty: 90 TAB | Refills: 0 | Status: SHIPPED | OUTPATIENT
Start: 2019-06-14 | End: 2019-07-14

## 2019-05-16 RX ORDER — VENLAFAXINE HYDROCHLORIDE 75 MG/1
75 CAPSULE, EXTENDED RELEASE ORAL EVERY MORNING
Qty: 90 CAP | Refills: 1 | Status: SHIPPED | OUTPATIENT
Start: 2019-05-16 | End: 2020-02-24

## 2019-05-16 ASSESSMENT — PATIENT HEALTH QUESTIONNAIRE - PHQ9: CLINICAL INTERPRETATION OF PHQ2 SCORE: 0

## 2019-05-16 NOTE — PROGRESS NOTES
PSYCHIATRY FOLLOW-UP NOTE      Chief Complaint   Patient presents with   • Follow-Up     ADHD, depression         History Of Present Illness:  Chris Garvin is a 64  y.o. old male with ADHD, major depressive disorder, sleep apnea on ASHLEY, Barrettes esophagus, IBS, GERD comes in today for follow up, was last seen 3 months ago.  He has been doing good in regards to his mood and ADHD since his last appointment with me.  He has had a lot of psychosocial stressors.  He is being supported to his wife who is going through some health issues and is helping her a lot.  He lost a friend recently to suicide which was hard for him but he feels that he is coming to terms with it.  He has a strong aruna in his [employer] and [employer] and feels that that helps him a lot.  He has been struggling with a lot of seasonal allergies which is normal for him this time of the year.  Sleep and appetite have been good.  He denies any active symptoms of depression.  He denies having thoughts of wanting to hurt himself or others.    Social History:   He has been  ×2 and  ×1.  He lives with his wife in [place].  He does not have any biological kids but has 2 step kids from his wife.  He is a retired .      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 90 Tab 0   • [START ON 6/14/2019] methylphenidate (RITALIN) 20 MG tablet Take 1 Tab by mouth 3 times a day for 30 days. 90 Tab 0   • [START ON 7/13/2019] methylphenidate (RITALIN) 20 MG tablet Take 1 Tab by mouth 3 times a day for 30 days. 90 Tab 0   • venlafaxine XR (EFFEXOR XR) 75 MG CAPSULE SR 24 HR Take 1 Cap by mouth every morning. 90 Cap 1   • dicyclomine (BENTYL) 10 MG Cap Take  "10 mg by mouth every 6 hours as needed.  2   • pantoprazole (PROTONIX) 40 MG Tablet Delayed Response Take 40 mg by mouth every morning.  5   • fluorouracil (EFUDEX) 5 % cream APPLY TO AFFECTED AREA TWICE A DAY FOR 2 WEEKS (AVOID EYE AREA)  0   • PROAIR  (90 Base) MCG/ACT Aero Soln inhalation aerosol Inhale 2 Puffs by mouth 4 times a day.  11   • clobetasol (TEMOVATE) 0.05 % Ointment APPLY TO AFFECTED AREA TWICE A DAY (AVOID FACE, GROIN,AXILLA)  1   • ibuprofen (MOTRIN) 800 MG TABS Take 1 Tab by mouth 3 times a day, with meals. 90 Tab 1     No current facility-administered medications for this visit.        Review Of Systems:    Constitutional - Positive for fatigue  HEENT - Positive for nasal congestion  Respiratory - Negative for shortness of breath, cough  CVS - Negative for chest pain, palpitations  GI - Negative for nausea, vomiting, abdominal pain, constipation. Positive for diarrhea  Musculoskeletal - Negative for back pain.  Neurological - Negative for headaches  Psychiatric - Negative for depression, anxiety, sleep problems    Physical Examination:  Vital signs: /84   Pulse 81   Ht 1.778 m (5' 10\")   Wt 106.6 kg (235 lb)   BMI 33.72 kg/m²     Musculoskeletal: Limping gait. No abnormal movements.     Mental Status Evaluation:   General: Elderly white male, dressed in casual attire, good grooming and hygiene, in no apparent distress, calm and cooperative, good eye contact, no psychomotor agitation or retardation  Orientation: Alert and oriented to person, place and time  Recent and remote memory: Grossly intact  Attention span and concentration: Grossly intact  Speech: Spontaneous, normal rate, rhythm and tone  Thought Process: Linear, logical and goal directed  Thought Content: Denies suicidal or homicidal ideations, intent or plan  Perception: Denies auditory or visual hallucinations. No delusions noted  Associations: Intact  Language: Appropriate  Fund of knowledge and vocabulary: Grossly " "adequate  Mood: \"not bad\"  Affect: Euthymic, mood congruent  Insight: Good  Judgment: Good    Depression screening:  Depression Screen (PHQ-2/PHQ-9) 9/25/2017 2/9/2018 5/16/2019   PHQ-2 Total Score 0 - -   PHQ-2 Total Score - 0 0   PHQ-9 Total Score 0 - -     Interpretation of PHQ-9 Total Score   Score Severity   1-4 No Depression   5-9 Mild Depression   10-14 Moderate Depression   15-19 Moderately Severe Depression   20-27 Severe Depression    Medical Records/Labs/Diagnostic Tests Reviewed:  [facility]  records - appropriate refills, no abuse suspected       Impression:  1. ADHD, inattentive type - stable   2. Major depressive disorder, recurrent, in full remission (with seasonal pattern, fall/winter onset) - stable  3. Seasonal affective disorder - resolved     Plan:  1. Continue Effexor XR 75 mg in the morning for depression  2. Continue Ritalin 20 mg three times daily for ADHD.  Provided him with 3 prescriptions for 90 tabs each.    Return to clinic in 3 months or sooner if symptoms worsen    The proposed treatment plan was discussed with the patient who was provided the opportunity to ask questions and make suggestions regarding alternative treatment. Patient verbalized understanding and expressed agreement with the plan.     Karo Romano M.D.  05/16/19    This note was created using voice recognition software (Dragon). The accuracy of the dictation is limited by the abilities of the software. I have reviewed the note prior to signing, however some errors in grammar and context are still possible. If you have any questions related to this note please do not hesitate to contact our office.   "

## 2019-08-13 DIAGNOSIS — F90.0 ADHD, PREDOMINANTLY INATTENTIVE TYPE: ICD-10-CM

## 2019-08-13 RX ORDER — METHYLPHENIDATE HYDROCHLORIDE 20 MG/1
20 TABLET ORAL 3 TIMES DAILY
Qty: 90 TAB | Refills: 0 | Status: SHIPPED | OUTPATIENT
Start: 2019-08-13 | End: 2019-08-19 | Stop reason: SDUPTHER

## 2019-08-13 NOTE — TELEPHONE ENCOUNTER
Was the patient seen in the last year in this department? Yes    Does patient have an active prescription for medications requested? No    Received Request Via: Patient

## 2019-08-19 ENCOUNTER — OFFICE VISIT (OUTPATIENT)
Dept: BEHAVIORAL HEALTH | Facility: CLINIC | Age: 64
End: 2019-08-19
Payer: COMMERCIAL

## 2019-08-19 VITALS
SYSTOLIC BLOOD PRESSURE: 127 MMHG | BODY MASS INDEX: 31.57 KG/M2 | HEART RATE: 90 BPM | HEIGHT: 70 IN | DIASTOLIC BLOOD PRESSURE: 85 MMHG

## 2019-08-19 DIAGNOSIS — F90.0 ADHD, PREDOMINANTLY INATTENTIVE TYPE: ICD-10-CM

## 2019-08-19 DIAGNOSIS — F33.42 MDD (MAJOR DEPRESSIVE DISORDER), RECURRENT, IN FULL REMISSION (HCC): ICD-10-CM

## 2019-08-19 PROCEDURE — 99214 OFFICE O/P EST MOD 30 MIN: CPT | Performed by: PSYCHIATRY & NEUROLOGY

## 2019-08-19 RX ORDER — METHYLPHENIDATE HYDROCHLORIDE 20 MG/1
20 TABLET ORAL 3 TIMES DAILY
Qty: 90 TAB | Refills: 0 | Status: SHIPPED | OUTPATIENT
Start: 2019-10-10 | End: 2019-11-09

## 2019-08-19 RX ORDER — METHYLPHENIDATE HYDROCHLORIDE 20 MG/1
20 TABLET ORAL 3 TIMES DAILY
Qty: 90 TAB | Refills: 0 | Status: SHIPPED | OUTPATIENT
Start: 2019-11-08 | End: 2019-12-08

## 2019-08-19 RX ORDER — METHYLPHENIDATE HYDROCHLORIDE 20 MG/1
20 TABLET ORAL 3 TIMES DAILY
Qty: 90 TAB | Refills: 0 | Status: SHIPPED | OUTPATIENT
Start: 2019-09-11 | End: 2019-10-11

## 2019-08-19 NOTE — PROGRESS NOTES
PSYCHIATRY FOLLOW-UP NOTE      Chief Complaint   Patient presents with   • Follow-Up     ADHD, depression         History Of Present Illness:  Chris Garvin is a 64  y.o. old male with ADHD, major depressive disorder, sleep apnea on ASHLEY, Barrettes esophagus, IBS, GERD comes in today for follow up, was last seen 3 months ago.  He has been doing all right in regards to his depression and ADHD since his last appointment with me.  He was having some psychosocial stressors.  He is scheduled to have a rotator cuff surgery on his left shoulder on Thursday and might need one on his right shoulder as well.  He was trying to remodel his bathroom and with his upcoming surgery that has gotten delayed.  He denies anhedonia, has been camping a lot this summer.  He continues to be active in his [employer] and Bible studies which helped him a lot.  He denies any problems with his sleep or appetite.  He still procrastinates but does not endorse any other symptoms of ADHD.  He denies having thoughts of wanting to hurt himself or others.    Social History:   He has been  ×2 and  ×1.  He lives with his wife in [place].  He does not have any biological kids but has 2 step kids from his wife.  He is a retired .      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 90 Tab 0   • [START ON 10/10/2019] methylphenidate (RITALIN) 20 MG tablet Take 1 Tab by mouth 3 times a day for 30 days. 90 Tab 0   • [START ON 9/11/2019] methylphenidate (RITALIN) 20 MG tablet Take 1 Tab by mouth 3 times a day for 30 days. 90 Tab 0   • venlafaxine XR (EFFEXOR XR) 75 MG CAPSULE SR 24 HR Take 1 Cap by mouth every morning. 90 Cap 1  "  • dicyclomine (BENTYL) 10 MG Cap Take 10 mg by mouth every 6 hours as needed.  2   • PROAIR  (90 Base) MCG/ACT Aero Soln inhalation aerosol Inhale 2 Puffs by mouth 4 times a day.  11   • pantoprazole (PROTONIX) 40 MG Tablet Delayed Response Take 40 mg by mouth every morning.  5   • ibuprofen (MOTRIN) 800 MG TABS Take 1 Tab by mouth 3 times a day, with meals. 90 Tab 1     No current facility-administered medications for this visit.        Review Of Systems:    Constitutional - Negative for fatigue  HEENT - Positive for nasal congestion  Respiratory - Negative for shortness of breath, cough  CVS - Negative for chest pain, palpitations  GI - Negative for nausea, vomiting, abdominal pain, constipation. Positive for infrequent diarrhea  Musculoskeletal - Negative for back pain. Positive for b/l shoulder pain  Neurological - Negative for headaches  Psychiatric - Negative for depression, sleep problems, impaired concentration     Physical Examination:  Vital signs: /85   Pulse 90   Ht 1.778 m (5' 10\")   BMI 31.57 kg/m²     Musculoskeletal: Limping gait. No abnormal movements.     Mental Status Evaluation:   General: Elderly white male, dressed in casual attire, good grooming and hygiene, in no apparent distress, calm and cooperative, good eye contact, no psychomotor agitation or retardation  Orientation: Alert and oriented to person, place and time  Recent and remote memory: Grossly intact  Attention span and concentration: Grossly intact  Speech: Spontaneous, normal rate, rhythm and tone  Thought Process: Linear, logical and goal directed  Thought Content: Denies suicidal or homicidal ideations, intent or plan  Perception: Denies auditory or visual hallucinations. No delusions noted  Associations: Intact  Language: Appropriate  Fund of knowledge and vocabulary: Grossly adequate  Mood: \"doing well\"  Affect: Euthymic, mood congruent  Insight: Good  Judgment: Good    Depression screening:  Depression Screen " (PHQ-2/PHQ-9) 9/25/2017 2/9/2018 5/16/2019   PHQ-2 Total Score 0 - -   PHQ-2 Total Score - 0 0   PHQ-9 Total Score 0 - -     Interpretation of PHQ-9 Total Score   Score Severity   1-4 No Depression   5-9 Mild Depression   10-14 Moderate Depression   15-19 Moderately Severe Depression   20-27 Severe Depression    Medical Records/Labs/Diagnostic Tests Reviewed:  [facility]  records - appropriate refills, no abuse suspected       Impression:  1. ADHD, inattentive type - stable   2. Major depressive disorder, recurrent, in full remission (with seasonal pattern, fall/winter onset) - stable    Plan:  1. Continue Effexor XR 75 mg in the morning for depression  2. Continue Ritalin 20 mg three times daily for ADHD.  Provided him with 3 prescriptions for 90 tabs each.    Return to clinic in 4 months or sooner if symptoms worsen    The proposed treatment plan was discussed with the patient who was provided the opportunity to ask questions and make suggestions regarding alternative treatment. Patient verbalized understanding and expressed agreement with the plan.     Karo Romano M.D.  08/19/19    This note was created using voice recognition software (Dragon). The accuracy of the dictation is limited by the abilities of the software. I have reviewed the note prior to signing, however some errors in grammar and context are still possible. If you have any questions related to this note please do not hesitate to contact our office.

## 2019-08-22 PROBLEM — G47.33 OSA (OBSTRUCTIVE SLEEP APNEA): Status: ACTIVE | Noted: 2019-08-22

## 2019-12-04 ENCOUNTER — OFFICE VISIT (OUTPATIENT)
Dept: BEHAVIORAL HEALTH | Facility: CLINIC | Age: 64
End: 2019-12-04
Payer: COMMERCIAL

## 2019-12-04 VITALS
DIASTOLIC BLOOD PRESSURE: 93 MMHG | SYSTOLIC BLOOD PRESSURE: 142 MMHG | HEIGHT: 70 IN | WEIGHT: 242 LBS | HEART RATE: 71 BPM | BODY MASS INDEX: 34.65 KG/M2

## 2019-12-04 DIAGNOSIS — F33.8 SEASONAL AFFECTIVE DISORDER (HCC): ICD-10-CM

## 2019-12-04 DIAGNOSIS — F33.42 MDD (MAJOR DEPRESSIVE DISORDER), RECURRENT, IN FULL REMISSION (HCC): ICD-10-CM

## 2019-12-04 DIAGNOSIS — F90.0 ADHD, PREDOMINANTLY INATTENTIVE TYPE: ICD-10-CM

## 2019-12-04 PROCEDURE — 99214 OFFICE O/P EST MOD 30 MIN: CPT | Performed by: PSYCHIATRY & NEUROLOGY

## 2019-12-04 RX ORDER — ACYCLOVIR 400 MG/1
400 TABLET ORAL 2 TIMES DAILY PRN
COMMUNITY
End: 2022-05-06

## 2019-12-04 RX ORDER — METHYLPHENIDATE HYDROCHLORIDE 20 MG/1
20 TABLET ORAL 3 TIMES DAILY
Qty: 90 TAB | Refills: 0 | Status: SHIPPED | OUTPATIENT
Start: 2020-02-05 | End: 2020-03-05 | Stop reason: SDUPTHER

## 2019-12-04 RX ORDER — METHYLPHENIDATE HYDROCHLORIDE 20 MG/1
20 TABLET ORAL 3 TIMES DAILY
Qty: 90 TAB | Refills: 0 | Status: SHIPPED | OUTPATIENT
Start: 2020-01-07 | End: 2020-02-06

## 2019-12-04 RX ORDER — METHYLPHENIDATE HYDROCHLORIDE 20 MG/1
20 TABLET ORAL 3 TIMES DAILY
Qty: 90 TAB | Refills: 0 | Status: SHIPPED | OUTPATIENT
Start: 2019-12-09 | End: 2020-01-08

## 2019-12-04 ASSESSMENT — PATIENT HEALTH QUESTIONNAIRE - PHQ9: CLINICAL INTERPRETATION OF PHQ2 SCORE: 1

## 2019-12-04 NOTE — PROGRESS NOTES
PSYCHIATRY FOLLOW-UP NOTE      Chief Complaint   Patient presents with   • Follow-Up     depression, ADHD         History Of Present Illness:  Chris Garvin is a 64  y.o. old male with ADHD, major depressive disorder, sleep apnea, Barrettes esophagus, IBS, GERD comes in today for follow up, was last seen over 3 months ago.  Noticed some struggle with depression lately but has been doing all right in regards to his ADHD.  He has noticed that every winter on the holiday season he struggles more with his depression.  He has an older brother and older half sister who live in [place] but he no longer has a close relationship with them which bothers him more around the holidays.  He is struggling with some lack of motivation and has not been able to put his [place] decorations as well.  He continues to have good support from his wife.  He has been compliant with his Effexor and Ritalin and endorses benefit.  He does feel that Ritalin helps him focus and concentrate and also helps with his mood as well.  He takes all 3 tablets of Ritalin within 3 to 4 hours of each other he does have a light box but has not been using it on a consistent basis even though he knows that it has helped him.  He denies any problems with his sleep or appetite.  He had a surgery done on his shoulder which has been healing appropriately.  He denies having thoughts of wanting to hurt himself or others.    Social History:   He has been  ×2 and  ×1.  He lives with his wife in [place].  He does not have any biological kids but has 2 step kids from his wife.  He is a retired .      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 90 Tab 0   • [START ON 1/7/2020] methylphenidate (RITALIN) 20 MG tablet Take 1 Tab by mouth 3 times a day for 30 days. 90 Tab 0   • [START ON 12/9/2019] methylphenidate (RITALIN) 20 MG tablet Take 1 Tab by mouth 3 times a day for 30 days. 90 Tab 0   • methylphenidate (RITALIN) 20 MG tablet Take 1 Tab by mouth 3 times a day for 30 days. 90 Tab 0   • venlafaxine XR (EFFEXOR XR) 75 MG CAPSULE SR 24 HR Take 1 Cap by mouth every morning. 90 Cap 1   • dicyclomine (BENTYL) 10 MG Cap Take 10 mg by mouth every 6 hours as needed.  2   • PROAIR  (90 Base) MCG/ACT Aero Soln inhalation aerosol Inhale 2 Puffs by mouth 4 times a day.  11   • pantoprazole (PROTONIX) 40 MG Tablet Delayed Response Take 40 mg by mouth every morning.  5   • ibuprofen (MOTRIN) 800 MG TABS Take 1 Tab by mouth 3 times a day, with meals. 90 Tab 1   • acyclovir (ZOVIRAX) 400 MG tablet Take 400 mg by mouth 2 times a day as needed.     • acetaminophen (TYLENOL) 500 MG Tab Take 500-1,000 mg by mouth every 6 hours as needed.       No current facility-administered medications for this visit.        Review Of Systems:    Constitutional - Negative for fatigue  HEENT - Positive for nasal congestion  Respiratory - Negative for shortness of breath, cough  CVS - Negative for chest pain, palpitations  GI - Negative for nausea, vomiting, abdominal pain, constipation. Positive for infrequent diarrhea  Musculoskeletal - Negative for back pain. Positive for b/l shoulder pain  Neurological - Negative for headaches  Psychiatric - Positive for depression.  Negative for sleep problems, impaired concentration     Physical Examination:  Vital signs: /93   Pulse 71   Ht 1.778 m (5' 10\")   Wt 109.8 kg (242 lb)   BMI 34.72 kg/m²     Musculoskeletal: Limping gait. No abnormal movements.     Mental Status Evaluation:   General: Elderly white male, dressed in casual attire, good grooming and hygiene, in no apparent distress, calm and cooperative, good " "eye contact, no psychomotor agitation or retardation  Orientation: Alert and oriented to person, place and time  Recent and remote memory: Grossly intact  Attention span and concentration: Grossly intact  Speech: Spontaneous, normal rate, rhythm and tone  Thought Process: Linear, logical and goal directed  Thought Content: Denies suicidal or homicidal ideations, intent or plan  Perception: Denies auditory or visual hallucinations. No delusions noted  Associations: Intact  Language: Appropriate  Fund of knowledge and vocabulary: Grossly adequate  Mood: \"am fine\"  Affect: Euthymic, mood congruent  Insight: Good  Judgment: Good    Depression screening:  Depression Screen (PHQ-2/PHQ-9) 2/9/2018 5/16/2019 12/4/2019   PHQ-2 Total Score - - -   PHQ-2 Total Score 0 0 1   PHQ-9 Total Score - - -     Interpretation of PHQ-9 Total Score   Score Severity   1-4 No Depression   5-9 Mild Depression   10-14 Moderate Depression   15-19 Moderately Severe Depression   20-27 Severe Depression    Medical Records/Labs/Diagnostic Tests Reviewed:  [facility]  records - appropriate refills, no abuse suspected       Impression:  1.  ADHD, inattentive type - stable   2.  Major depressive disorder, recurrent, in full remission (with seasonal pattern, fall/winter onset) - slight worsening  3.  Seasonal affective disorder - worsening    Plan:  1.  Continue Effexor XR 75 mg in the morning for depression  2.  Continue Ritalin 20 mg three times daily for ADHD.  Provided him with 3 prescriptions for 90 tabs each.  3.  Continue individual psychotherapy  4.  Encouraged him to start using has light box for 30 minutes every morning should help with his depression    Return to clinic in 3 months or sooner if symptoms worsen    The proposed treatment plan was discussed with the patient who was provided the opportunity to ask questions and make suggestions regarding alternative treatment. Patient verbalized understanding and expressed agreement with the plan. "     Karo Romano M.D.  12/04/19    This note was created using voice recognition software (Dragon). The accuracy of the dictation is limited by the abilities of the software. I have reviewed the note prior to signing, however some errors in grammar and context are still possible. If you have any questions related to this note please do not hesitate to contact our office.

## 2020-03-05 ENCOUNTER — APPOINTMENT (OUTPATIENT)
Dept: BEHAVIORAL HEALTH | Facility: CLINIC | Age: 65
End: 2020-03-05
Payer: MEDICARE

## 2020-03-05 DIAGNOSIS — F90.0 ADHD, PREDOMINANTLY INATTENTIVE TYPE: ICD-10-CM

## 2020-03-05 RX ORDER — METHYLPHENIDATE HYDROCHLORIDE 20 MG/1
20 TABLET ORAL 3 TIMES DAILY
Qty: 90 TAB | Refills: 0 | Status: CANCELLED | OUTPATIENT
Start: 2020-04-07 | End: 2020-05-07

## 2020-03-05 RX ORDER — METHYLPHENIDATE HYDROCHLORIDE 20 MG/1
20 TABLET ORAL 3 TIMES DAILY
Qty: 90 TAB | Refills: 0 | Status: SHIPPED | OUTPATIENT
Start: 2020-03-09 | End: 2020-04-08

## 2020-03-05 RX ORDER — METHYLPHENIDATE HYDROCHLORIDE 20 MG/1
20 TABLET ORAL 3 TIMES DAILY
Qty: 90 TAB | Refills: 0 | Status: CANCELLED | OUTPATIENT
Start: 2020-05-06 | End: 2020-06-05

## 2020-03-05 RX ORDER — METHYLPHENIDATE HYDROCHLORIDE 20 MG/1
20 TABLET ORAL 3 TIMES DAILY
Qty: 90 TAB | Refills: 0 | Status: CANCELLED | OUTPATIENT
Start: 2020-03-09 | End: 2020-04-08

## 2020-04-06 ENCOUNTER — OFFICE VISIT (OUTPATIENT)
Dept: BEHAVIORAL HEALTH | Facility: CLINIC | Age: 65
End: 2020-04-06
Payer: MEDICARE

## 2020-04-06 DIAGNOSIS — F33.42 MDD (MAJOR DEPRESSIVE DISORDER), RECURRENT, IN FULL REMISSION (HCC): ICD-10-CM

## 2020-04-06 DIAGNOSIS — F90.0 ADHD, PREDOMINANTLY INATTENTIVE TYPE: ICD-10-CM

## 2020-04-06 PROCEDURE — 99442 PR PHYSICIAN TELEPHONE EVALUATION 11-20 MIN: CPT | Mod: CR | Performed by: PSYCHIATRY & NEUROLOGY

## 2020-04-06 RX ORDER — VENLAFAXINE HYDROCHLORIDE 75 MG/1
75 CAPSULE, EXTENDED RELEASE ORAL DAILY
Qty: 90 CAP | Refills: 1 | Status: SHIPPED | OUTPATIENT
Start: 2020-04-06 | End: 2020-09-17 | Stop reason: SDUPTHER

## 2020-04-06 NOTE — PROGRESS NOTES
Telephone Appointment Visit   As a means of avoiding spread of COVID-19, this visit is being conducted by telephone. This telephone visit was initiated by the patient and they verbally consented.    Time at start of call: 11:46 AM    Reason for Call:  Medication Follow-up and Symptom Follow-up    Patient Comments / History:   He is doing better in regards to his depression with the time change.  He is trying to take walks on a daily basis and feels that having more somewhat around has been good for his depression.  He is no longer using the light box but is still compliant with Effexor and endorses benefit.  He is trying to stay indoors and is working on finishing off his bathroom elevation just keeping him busy.  He has been doing [employer] online and it has been going well.  He denies any problems with his sleep or appetite.  He endorses benefit from Ritalin on his ADHD symptoms as well and denies any side effects.  He denies any other psychosocial stressors.  He denies having thoughts of wanting to hurt himself or others.    Labs / Images Reviewed   [facility]  -appropriate refills, no abuse suspected    Assessment and Plan:     1. MDD (major depressive disorder), recurrent, in full remission (HCC)  -Continue Effexor XR 75 mg daily for depression    2. ADHD, predominantly inattentive type  -Continue Ritalin 20 mg 3 times daily for ADHD    Follow-up: Return in about 3 months (around 7/6/2020) for 30 minutes.    Time at end of call: 11:58 AM  Total Time Spent: 11-20 minutes    Karo Romano M.D.

## 2020-04-07 DIAGNOSIS — F90.0 ADHD, PREDOMINANTLY INATTENTIVE TYPE: ICD-10-CM

## 2020-04-07 RX ORDER — METHYLPHENIDATE HYDROCHLORIDE 20 MG/1
20 TABLET ORAL 3 TIMES DAILY
Qty: 90 TAB | Refills: 0 | Status: SHIPPED | OUTPATIENT
Start: 2020-05-06 | End: 2020-05-14

## 2020-04-07 RX ORDER — METHYLPHENIDATE HYDROCHLORIDE 20 MG/1
20 TABLET ORAL 3 TIMES DAILY
Qty: 90 TAB | Refills: 0 | Status: SHIPPED | OUTPATIENT
Start: 2020-06-04 | End: 2020-07-04

## 2020-04-07 RX ORDER — METHYLPHENIDATE HYDROCHLORIDE 20 MG/1
20 TABLET ORAL 3 TIMES DAILY
Qty: 90 TAB | Refills: 0 | Status: SHIPPED | OUTPATIENT
Start: 2020-04-07 | End: 2020-05-07

## 2020-07-09 ENCOUNTER — OFFICE VISIT (OUTPATIENT)
Dept: BEHAVIORAL HEALTH | Facility: CLINIC | Age: 65
End: 2020-07-09
Payer: MEDICARE

## 2020-07-09 VITALS — HEIGHT: 70 IN | BODY MASS INDEX: 33 KG/M2

## 2020-07-09 DIAGNOSIS — F90.0 ADHD, PREDOMINANTLY INATTENTIVE TYPE: ICD-10-CM

## 2020-07-09 DIAGNOSIS — F33.42 MDD (MAJOR DEPRESSIVE DISORDER), RECURRENT, IN FULL REMISSION (HCC): ICD-10-CM

## 2020-07-09 PROCEDURE — 99443 PR PHYSICIAN TELEPHONE EVALUATION 21-30 MIN: CPT | Mod: CR | Performed by: PSYCHIATRY & NEUROLOGY

## 2020-07-09 RX ORDER — METHYLPHENIDATE HYDROCHLORIDE 20 MG/1
20 TABLET ORAL 3 TIMES DAILY
Qty: 90 TAB | Refills: 0 | Status: SHIPPED | OUTPATIENT
Start: 2020-07-09 | End: 2020-07-10 | Stop reason: SDUPTHER

## 2020-07-09 RX ORDER — TRAMADOL HYDROCHLORIDE 50 MG/1
TABLET ORAL
COMMUNITY
Start: 2020-05-28 | End: 2020-07-10

## 2020-07-09 NOTE — PROGRESS NOTES
Telephone Appointment Visit   As a means of avoiding spread of COVID-19, this visit is being conducted by telephone. This telephone visit was initiated by the patient and they verbally consented.    Time at start of call: 3:48 PM    Reason for Call:  Medication Follow-up and Symptom Follow-up    HPI:    He has been doing all right in regards to his depression but found out yesterday that his 9-year-old dog has an invasive cancer and we might have to put him down which has been a little hard for him and his wife.  He, otherwise, feels that life has been going as good as it can be at this point.  He is trying to be supportive to his wife who is struggling with pain problems.  He is also scheduled to have shoulder replacement surgery this month and is hopeful that things will improve.  He is compliant with Effexor and endorses benefit.  He is staying active and is eating healthy and has lost about 12 pounds in the last few months.  He is doing good in regards to his ADHD symptoms controlled with Ritalin but finds it difficult to take it 3 times a day.  He was on Provigil when he was in [place] and it was a once a day dosing and it worked well for him as well.  He denies any thoughts of wanting to hurt himself or others.    Labs / Images Reviewed:   [facility]  - appropriate refills, no abuse suspected     Assessment and Plan:     1. MDD (major depressive disorder), recurrent, in full remission (HCC)  2. ADHD, predominantly inattentive type    -Continue Effexor XR 75 mg daily for depression  -Continue Ritalin 20 mg 3 times daily for ADHD.  E- prescribed for 30 days.  Discussed switching over to Concerta which is an extended release formulation and he is agreeable.  Informed him that it might require a prior authorization from his pharmacy and he will send me a [employer] message 3 weeks into this prescription to start the process.  Discussed that his equivalent dose would be Concerta 72 mg in the morning and he is  agreeable.    Follow-up: 2-3 months    Time at end of call: 4:10 PM   Total Time Spent: 21-30 minutes    Karo Romano M.D.

## 2020-07-10 DIAGNOSIS — F90.0 ADHD, PREDOMINANTLY INATTENTIVE TYPE: ICD-10-CM

## 2020-07-10 RX ORDER — METHYLPHENIDATE HYDROCHLORIDE 20 MG/1
20 TABLET ORAL 3 TIMES DAILY
Qty: 90 TAB | Refills: 0 | Status: SHIPPED | OUTPATIENT
Start: 2020-07-10 | End: 2020-08-04

## 2020-08-04 DIAGNOSIS — F90.0 ADHD (ATTENTION DEFICIT HYPERACTIVITY DISORDER), INATTENTIVE TYPE: ICD-10-CM

## 2020-08-04 RX ORDER — METHYLPHENIDATE HYDROCHLORIDE 36 MG/1
72 TABLET ORAL EVERY MORNING
Qty: 60 TAB | Refills: 0 | Status: SHIPPED | OUTPATIENT
Start: 2020-08-04 | End: 2020-09-04 | Stop reason: SDUPTHER

## 2020-09-04 DIAGNOSIS — F90.0 ADHD (ATTENTION DEFICIT HYPERACTIVITY DISORDER), INATTENTIVE TYPE: ICD-10-CM

## 2020-09-04 RX ORDER — METHYLPHENIDATE HYDROCHLORIDE 36 MG/1
72 TABLET ORAL EVERY MORNING
Qty: 60 TAB | Refills: 0 | Status: SHIPPED | OUTPATIENT
Start: 2020-09-05 | End: 2020-09-17

## 2020-09-17 ENCOUNTER — TELEMEDICINE (OUTPATIENT)
Dept: BEHAVIORAL HEALTH | Facility: CLINIC | Age: 65
End: 2020-09-17
Payer: MEDICARE

## 2020-09-17 VITALS — HEIGHT: 70 IN | WEIGHT: 240 LBS | BODY MASS INDEX: 34.36 KG/M2

## 2020-09-17 DIAGNOSIS — F90.0 ADHD, PREDOMINANTLY INATTENTIVE TYPE: ICD-10-CM

## 2020-09-17 DIAGNOSIS — F33.42 MDD (MAJOR DEPRESSIVE DISORDER), RECURRENT, IN FULL REMISSION (HCC): ICD-10-CM

## 2020-09-17 PROCEDURE — 99213 OFFICE O/P EST LOW 20 MIN: CPT | Mod: 95,CR | Performed by: PSYCHIATRY & NEUROLOGY

## 2020-09-17 RX ORDER — VENLAFAXINE HYDROCHLORIDE 75 MG/1
75 CAPSULE, EXTENDED RELEASE ORAL DAILY
Qty: 90 CAP | Refills: 1 | Status: SHIPPED | OUTPATIENT
Start: 2020-09-17 | End: 2021-02-23 | Stop reason: SDUPTHER

## 2020-09-17 RX ORDER — METHYLPHENIDATE HYDROCHLORIDE 36 MG/1
72 TABLET ORAL EVERY MORNING
Qty: 60 TAB | Refills: 0 | Status: SHIPPED | OUTPATIENT
Start: 2020-10-04 | End: 2020-11-03

## 2020-09-17 RX ORDER — METHYLPHENIDATE HYDROCHLORIDE 36 MG/1
72 TABLET ORAL EVERY MORNING
Qty: 60 TAB | Refills: 0 | Status: SHIPPED | OUTPATIENT
Start: 2020-12-01 | End: 2020-12-31 | Stop reason: SDUPTHER

## 2020-09-17 RX ORDER — METHYLPHENIDATE HYDROCHLORIDE 36 MG/1
72 TABLET ORAL EVERY MORNING
Qty: 60 TAB | Refills: 0 | Status: SHIPPED | OUTPATIENT
Start: 2020-11-02 | End: 2020-12-02

## 2020-09-17 NOTE — PROGRESS NOTES
PSYCHIATRY VIRTUAL VISIT FOLLOW-UP NOTE      Chief Complaint   Patient presents with   • Follow-Up     depression, ADHD       This evaluation was conducted via Zoom using secure and encrypted videoconferencing technology. The patient was in a private location in the [facility].    The patient's identity was confirmed and verbal consent was obtained for this virtual visit.    History Of Present Illness:  Chris Garvin is a 65 y.o. old male with ADHD, major depressive disorder, sleep apnea, Layton's esophagus, IBS, GERD, sleep apnea comes in today for follow up, was last seen over 2 months ago.  He has been doing all right in regards to his ADHD and depression since his last visit with me.  He was switched to Concerta at his last appointment and he has been taking it without any significant side effects.  He has noticed that the onset is more mellow than Rituxan and he likes that.  He has not noticed any problems with his focus and concentration with the Concerta.  He is noted that if he takes it too late in the day it causes sleep problems that he is trying to take it as early as possible.  He feels overall okay in regards to his depression.  He feels that the pandemic and the smoke has caused some issues but he is trying to stay optimistic.  He is worried about his wife who is struggling with more back pain.  They also had to put their dog down which was really hard for both of them.  He is trying to look for a part-time job to keep himself busy and has applied at [employer] for a  position.  He denies any struggles with his appetite or physical health.    Social History:   He is ,  ×2 and  ×1, lives with wife in [place], no biological kids but 2 steps, retired .      Substance Use:  Alcohol - Denies  Nicotine - Denies  Illicit drugs - Denies    Previous medication trials:  Wellbutrin (effective), Ritalin 20 mg TID (effective)  "    Medications:  Current Outpatient Medications   Medication Sig Dispense Refill   • ibuprofen (MOTRIN) 600 MG Tab Take 600 mg by mouth every 6 hours as needed.     • multivitamin (THERAGRAN) Tab Take 1 Tab by mouth every day.     • Non Formulary Request every day. Takes supplements for health.     • loratadine/pseudo SR (CLARITIN D) 5-120 MG TABLET SR 12 HR Take 1 Tab by mouth 1 time daily as needed.     • venlafaxine XR (EFFEXOR XR) 75 MG CAPSULE SR 24 HR Take 1 Cap by mouth every day. 90 Cap 1   • acyclovir (ZOVIRAX) 400 MG tablet Take 400 mg by mouth 2 times a day as needed.     • acetaminophen (TYLENOL) 500 MG Tab Take 500-1,000 mg by mouth every 6 hours as needed.     • dicyclomine (BENTYL) 10 MG Cap Take 10 mg by mouth every 6 hours as needed.  2   • PROAIR  (90 Base) MCG/ACT Aero Soln inhalation aerosol Inhale 2 Puffs by mouth 4 times a day.  11   • pantoprazole (PROTONIX) 40 MG Tablet Delayed Response Take 40 mg by mouth every morning.  5     No current facility-administered medications for this visit.        Review Of Systems:    Constitutional - Negative for fatigue  Respiratory - Negative for shortness of breath, cough  CVS - Negative for chest pain, palpitations  GI - Negative for nausea, vomiting, abdominal pain, constipation, diarrhea   Musculoskeletal - Negative for back pain  Neurological - Negative for headaches  Psychiatric - Positive for infrequent depression.  Negative for sleep problems, impaired concentration     Physical Examination:  Vital signs: Ht 1.778 m (5' 10\")   Wt 108.9 kg (240 lb)   BMI 34.44 kg/m²     Musculoskeletal: No abnormal movements.     Mental Status Evaluation:   General: Elderly white male, dressed in casual attire, good grooming and hygiene, in no apparent distress, calm and cooperative, good eye contact, no psychomotor agitation or retardation  Orientation: Alert and oriented to person, place and time  Recent and remote memory: Grossly intact  Attention span " "and concentration: Grossly intact  Speech: Spontaneous, normal rate, rhythm and tone  Thought Process: Linear, logical and goal directed  Thought Content: Denies suicidal or homicidal ideations, intent or plan  Perception: Denies auditory or visual hallucinations. No delusions noted  Associations: Intact  Language: Appropriate  Fund of knowledge and vocabulary: Grossly adequate  Mood: \"fine\"  Affect: Euthymic, mood congruent  Insight: Good  Judgment: Good    Depression screening:  Depression Screen (PHQ-2/PHQ-9) 2/9/2018 5/16/2019 12/4/2019   PHQ-2 Total Score - - -   PHQ-2 Total Score 0 0 1   PHQ-9 Total Score - - -     Interpretation of PHQ-9 Total Score   Score Severity   1-4 No Depression   5-9 Mild Depression   10-14 Moderate Depression   15-19 Moderately Severe Depression   20-27 Severe Depression    Medical Records/Labs/Diagnostic Tests Reviewed:  [facility] records - appropriate refills, no abuse suspected       Impression:  1.  ADHD, inattentive type - stable   2.  Major depressive disorder, recurrent, in full remission (with seasonal pattern, fall/winter onset) - stable    Plan:  1.  Continue Effexor XR 75 mg in the morning for depression  2.  Continue Concerta 72 mg in the morning ADHD.  E-prescribed for 3 months.    Return to clinic in 3-4 months or sooner if symptoms worsen    The proposed treatment plan was discussed with the patient who was provided the opportunity to ask questions and make suggestions regarding alternative treatment. Patient verbalized understanding and expressed agreement with the plan.     Karo Romano M.D.  09/17/20    This note was created using voice recognition software (Dragon). The accuracy of the dictation is limited by the abilities of the software. I have reviewed the note prior to signing, however some errors in grammar and context are still possible. If you have any questions related to this note please do not hesitate to contact our office.   "

## 2020-12-31 DIAGNOSIS — F90.0 ADHD, PREDOMINANTLY INATTENTIVE TYPE: ICD-10-CM

## 2020-12-31 RX ORDER — METHYLPHENIDATE HYDROCHLORIDE 36 MG/1
72 TABLET ORAL EVERY MORNING
Qty: 60 TAB | Refills: 0 | Status: SHIPPED | OUTPATIENT
Start: 2021-01-01 | End: 2021-01-31

## 2021-02-03 ENCOUNTER — PATIENT MESSAGE (OUTPATIENT)
Dept: BEHAVIORAL HEALTH | Facility: CLINIC | Age: 66
End: 2021-02-03

## 2021-02-03 DIAGNOSIS — F90.0 ADHD, PREDOMINANTLY INATTENTIVE TYPE: ICD-10-CM

## 2021-02-03 DIAGNOSIS — F90.0 ADHD (ATTENTION DEFICIT HYPERACTIVITY DISORDER), INATTENTIVE TYPE: ICD-10-CM

## 2021-02-03 RX ORDER — METHYLPHENIDATE HYDROCHLORIDE 36 MG/1
72 TABLET ORAL EVERY MORNING
Qty: 60 TAB | Refills: 0 | OUTPATIENT
Start: 2021-02-03 | End: 2021-03-05

## 2021-02-03 RX ORDER — METHYLPHENIDATE HYDROCHLORIDE 36 MG/1
72 TABLET ORAL EVERY MORNING
Qty: 60 TAB | Refills: 0 | Status: SHIPPED | OUTPATIENT
Start: 2021-02-03 | End: 2021-03-05

## 2021-02-03 NOTE — TELEPHONE ENCOUNTER
Received request via: Patient    Was the patient seen in the last year in this department? Yes    Does the patient have an active prescription (recently filled or refills available) for medication(s) requested? No'

## 2021-02-23 ENCOUNTER — TELEMEDICINE (OUTPATIENT)
Dept: BEHAVIORAL HEALTH | Facility: CLINIC | Age: 66
End: 2021-02-23
Payer: MEDICARE

## 2021-02-23 VITALS — WEIGHT: 240 LBS | BODY MASS INDEX: 34.36 KG/M2 | HEIGHT: 70 IN

## 2021-02-23 DIAGNOSIS — F90.0 ADHD, PREDOMINANTLY INATTENTIVE TYPE: ICD-10-CM

## 2021-02-23 DIAGNOSIS — F33.42 MDD (MAJOR DEPRESSIVE DISORDER), RECURRENT, IN FULL REMISSION (HCC): ICD-10-CM

## 2021-02-23 PROCEDURE — 90833 PSYTX W PT W E/M 30 MIN: CPT | Mod: 95 | Performed by: PSYCHIATRY & NEUROLOGY

## 2021-02-23 PROCEDURE — 99214 OFFICE O/P EST MOD 30 MIN: CPT | Mod: 95 | Performed by: PSYCHIATRY & NEUROLOGY

## 2021-02-23 RX ORDER — METHYLPHENIDATE HYDROCHLORIDE 36 MG/1
72 TABLET ORAL EVERY MORNING
Qty: 60 TABLET | Refills: 0 | Status: SHIPPED | OUTPATIENT
Start: 2021-05-01 | End: 2021-05-25 | Stop reason: SINTOL

## 2021-02-23 RX ORDER — METHYLPHENIDATE HYDROCHLORIDE 36 MG/1
72 TABLET ORAL EVERY MORNING
Qty: 60 TABLET | Refills: 0 | Status: SHIPPED | OUTPATIENT
Start: 2021-03-04 | End: 2021-04-03

## 2021-02-23 RX ORDER — METHYLPHENIDATE HYDROCHLORIDE 36 MG/1
72 TABLET ORAL EVERY MORNING
Qty: 60 TABLET | Refills: 0 | Status: SHIPPED | OUTPATIENT
Start: 2021-04-02 | End: 2021-05-02

## 2021-02-23 RX ORDER — VENLAFAXINE HYDROCHLORIDE 75 MG/1
75 CAPSULE, EXTENDED RELEASE ORAL DAILY
Qty: 90 CAPSULE | Refills: 1 | Status: SHIPPED | OUTPATIENT
Start: 2021-02-23 | End: 2021-07-20 | Stop reason: SDUPTHER

## 2021-02-23 ASSESSMENT — PATIENT HEALTH QUESTIONNAIRE - PHQ9
4. FEELING TIRED OR HAVING LITTLE ENERGY: SEVERAL DAYS
6. FEELING BAD ABOUT YOURSELF - OR THAT YOU ARE A FAILURE OR HAVE LET YOURSELF OR YOUR FAMILY DOWN: NOT AL ALL
SUM OF ALL RESPONSES TO PHQ QUESTIONS 1-9: 6
8. MOVING OR SPEAKING SO SLOWLY THAT OTHER PEOPLE COULD HAVE NOTICED. OR THE OPPOSITE, BEING SO FIGETY OR RESTLESS THAT YOU HAVE BEEN MOVING AROUND A LOT MORE THAN USUAL: NOT AT ALL
3. TROUBLE FALLING OR STAYING ASLEEP OR SLEEPING TOO MUCH: SEVERAL DAYS
2. FEELING DOWN, DEPRESSED, IRRITABLE, OR HOPELESS: MORE THAN HALF THE DAYS
5. POOR APPETITE OR OVEREATING: NOT AT ALL
1. LITTLE INTEREST OR PLEASURE IN DOING THINGS: MORE THAN HALF THE DAYS
9. THOUGHTS THAT YOU WOULD BE BETTER OFF DEAD, OR OF HURTING YOURSELF: NOT AT ALL
SUM OF ALL RESPONSES TO PHQ9 QUESTIONS 1 AND 2: 4
7. TROUBLE CONCENTRATING ON THINGS, SUCH AS READING THE NEWSPAPER OR WATCHING TELEVISION: NOT AT ALL

## 2021-02-23 NOTE — PROGRESS NOTES
Called patient . HbA1c 4.5  Is good.  Sugar . Potassium , kidney and LFT is good.  Lipids are good. TSH is nl  Magnesium is good . Vitamin D is good and much better .  Urine is clear.  CBC is nl. PSYCHIATRY VIRTUAL VISIT FOLLOW-UP NOTE      Chief Complaint   Patient presents with   • Follow-Up     ADHD, depression       This evaluation was conducted via Zoom using secure and encrypted videoconferencing technology. The patient was in a private location in the [facility].    The patient's identity was confirmed and verbal consent was obtained for this virtual visit.    History Of Present Illness:  Chrsi Garvin is a 65 y.o. male with ADHD, major depressive disorder, sleep apnea, Layton's esophagus, IBS, GERD, sleep apnea comes in today for follow up, was last seen over 5 months ago.  He has been doing all right in regards to his mental health since his last visit with me.  He reports some mild ups and downs in regards to his depression but overall he feels all right.  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  He has not been doing much for himself and at times feels guilty as his wife struggles with her physical health and is not able to do all the stuff that they have been doing in the past.  He has had some struggles.  Sleep.  He is really working hard on forgiveness and letting go of some of the past anger.  He has been compliant with his Concerta and Effexor and endorses benefit.  He denies having thoughts of wanting to hurt himself or others.    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     Substance Use:  Alcohol - Denies  Nicotine - Denies  Cannabis - Denies   Illicit drugs - Denies    Previous medication trials:  Wellbutrin (effective), Ritalin 20 mg TID (effective)     Medications:  Current Outpatient Medications   Medication Sig Dispense Refill   • methylphenidate (CONCERTA) 36 MG CR  "tablet Take 2 Tabs by mouth every morning for 30 days. 60 Tab 0   • venlafaxine XR (EFFEXOR XR) 75 MG CAPSULE SR 24 HR Take 1 Cap by mouth every day. 90 Cap 1   • ibuprofen (MOTRIN) 600 MG Tab Take 600 mg by mouth every 6 hours as needed.     • multivitamin (THERAGRAN) Tab Take 1 Tab by mouth every day.     • Non Formulary Request every day. Takes supplements for health.     • loratadine/pseudo SR (CLARITIN D) 5-120 MG TABLET SR 12 HR Take 1 Tab by mouth 1 time daily as needed.     • acyclovir (ZOVIRAX) 400 MG tablet Take 400 mg by mouth 2 times a day as needed.     • acetaminophen (TYLENOL) 500 MG Tab Take 500-1,000 mg by mouth every 6 hours as needed.     • dicyclomine (BENTYL) 10 MG Cap Take 10 mg by mouth every 6 hours as needed.  2   • PROAIR  (90 Base) MCG/ACT Aero Soln inhalation aerosol Inhale 2 Puffs by mouth 4 times a day.  11   • pantoprazole (PROTONIX) 40 MG Tablet Delayed Response Take 40 mg by mouth every morning.  5     No current facility-administered medications for this visit.       Review Of Systems:    Constitutional - Negative for fatigue  Psychiatric - Positive for infrequent depression.  Negative for sleep problems, impaired concentration     Physical Examination:  Vital signs: Ht 1.778 m (5' 10\")   Wt 109 kg (240 lb)   BMI 34.44 kg/m²     Musculoskeletal: No abnormal movements.     Mental Status Evaluation:   General: Elderly white male, dressed in casual attire, good grooming and hygiene, in no apparent distress, calm and cooperative, good eye contact, no psychomotor agitation or retardation  Orientation: Alert and oriented to person, place and time  Recent and remote memory: Grossly intact  Attention span and concentration: Grossly intact  Speech: Spontaneous, normal rate, rhythm and tone  Thought Process: Linear, logical and goal directed  Thought Content: Denies suicidal or homicidal ideations, intent or plan  Perception: Denies auditory or visual hallucinations. No delusions " "noted  Associations: Intact  Language: Appropriate  Fund of knowledge and vocabulary: Grossly adequate  Mood: \"fine\"  Affect: Euthymic, mood congruent  Insight: Good  Judgment: Good    Depression screening:  Depression Screen (PHQ-2/PHQ-9) 5/16/2019 12/4/2019 2/23/2021   PHQ-2 Total Score - - -   PHQ-2 Total Score - - 4   PHQ-2 Total Score 0 1 -   PHQ-9 Total Score - - -   PHQ-9 Total Score - - 6     Interpretation of PHQ-9 Total Score   Score Severity   1-4 No Depression   5-9 Mild Depression   10-14 Moderate Depression   15-19 Moderately Severe Depression   20-27 Severe Depression    Medical Records/Labs/Diagnostic Tests Reviewed:  [place]  records - appropriate refills, no abuse suspected       Impression:  1.  ADHD, inattentive type   2.  Major depressive disorder, recurrent, in full remission (with seasonal pattern, fall/winter onset)     Plan:  1.  Continue Effexor XR 75 mg in the morning for depression  2.  Continue Concerta 72 mg in the morning ADHD.  E-prescribed for 3 months.  3.  Provided supportive psychotherapy (> 16 minutes) in regards to his depression and changes in his marriage because of his wife's struggles with chronic pain.  Encouraged him to do something for himself like snowboarding at least 1-2 times a month even though his wife cannot accompany him anymore.  Discussed his guilt feelings associated with having fun in his life.    Return to clinic in 3 months or sooner if symptoms worsen    The proposed treatment plan was discussed with the patient who was provided the opportunity to ask questions and make suggestions regarding alternative treatment. Patient verbalized understanding and expressed agreement with the plan.     Karo Romano M.D.  02/23/21    This note was created using voice recognition software (Dragon). The accuracy of the dictation is limited by the abilities of the software. I have reviewed the note prior to signing, however some errors in grammar and context are still " possible. If you have any questions related to this note please do not hesitate to contact our office.

## 2021-05-25 ENCOUNTER — TELEMEDICINE (OUTPATIENT)
Dept: BEHAVIORAL HEALTH | Facility: CLINIC | Age: 66
End: 2021-05-25
Payer: MEDICARE

## 2021-05-25 VITALS — HEIGHT: 70 IN | BODY MASS INDEX: 34.44 KG/M2

## 2021-05-25 DIAGNOSIS — F90.0 ADHD, PREDOMINANTLY INATTENTIVE TYPE: ICD-10-CM

## 2021-05-25 DIAGNOSIS — F33.42 MDD (MAJOR DEPRESSIVE DISORDER), RECURRENT, IN FULL REMISSION (HCC): ICD-10-CM

## 2021-05-25 PROBLEM — M41.9 KYPHOSCOLIOSIS DEFORMITY OF SPINE: Status: ACTIVE | Noted: 2021-05-25

## 2021-05-25 PROBLEM — J45.909 ASTHMA: Status: ACTIVE | Noted: 2021-05-25

## 2021-05-25 PROCEDURE — 99214 OFFICE O/P EST MOD 30 MIN: CPT | Mod: 95 | Performed by: PSYCHIATRY & NEUROLOGY

## 2021-05-25 RX ORDER — DEXTROAMPHETAMINE SACCHARATE, AMPHETAMINE ASPARTATE MONOHYDRATE, DEXTROAMPHETAMINE SULFATE AND AMPHETAMINE SULFATE 7.5; 7.5; 7.5; 7.5 MG/1; MG/1; MG/1; MG/1
30 CAPSULE, EXTENDED RELEASE ORAL EVERY MORNING
Qty: 30 CAPSULE | Refills: 0 | Status: SHIPPED | OUTPATIENT
Start: 2021-05-25 | End: 2021-06-24

## 2021-05-25 RX ORDER — DEXTROAMPHETAMINE SACCHARATE, AMPHETAMINE ASPARTATE MONOHYDRATE, DEXTROAMPHETAMINE SULFATE AND AMPHETAMINE SULFATE 7.5; 7.5; 7.5; 7.5 MG/1; MG/1; MG/1; MG/1
30 CAPSULE, EXTENDED RELEASE ORAL EVERY MORNING
Qty: 30 CAPSULE | Refills: 0 | Status: SHIPPED | OUTPATIENT
Start: 2021-06-23 | End: 2021-07-01 | Stop reason: SDUPTHER

## 2021-05-25 NOTE — PROGRESS NOTES
PSYCHIATRY VIRTUAL VISIT FOLLOW-UP NOTE      Chief Complaint   Patient presents with   • Follow-Up     depression, ADHD       This evaluation was conducted via Zoom using secure and encrypted videoconferencing technology. The patient was in a private location in the [facility].    The patient's identity was confirmed and verbal consent was obtained for this virtual visit.    History Of Present Illness:  Chris Garvin is a 66 y.o. male with ADHD, major depressive disorder, Layton's esophagus, IBS, GERD, sleep apnea comes in today for follow up, was last seen 3 months ago.  He has had some ups and downs in regards to his depression.  He is compliant with Effexor and is endorsing benefit.  He continues to stay active with his [employer] and is also working few days a week.  He denies any new psychosocial stressors.  He is doing good in regards to his ADHD with Concerta.  However, he is noticing abdominal discomfort and worsening IBS symptoms when he takes his dose in the morning.  He has tried to take 36 mg which causes less side effects but it does not help his ADHD symptoms.  He is agreeable to trying a different medication.  He is having some issues with his sleep as his dog chewed part of his CPAP machine.  He is doing all right in regards to his appetite.  He denies having thoughts of wanting to hurt himself.    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     Substance Use:  Alcohol - Denies  Nicotine - Denies  Cannabis - Denies   Illicit drugs - Denies    Previous medication trials:  Wellbutrin (effective), Ritalin 20 mg TID (effective but problems with compliance due to TID dosing), Concerta 72 mg (effective, s/e - abdominal discomfort)    Medications:  Current Outpatient Medications   Medication Sig Dispense Refill   • methylphenidate (CONCERTA) 36 MG  CR tablet Take 2 Tablets by mouth every morning for 30 days. 60 tablet 0   • venlafaxine XR (EFFEXOR XR) 75 MG CAPSULE SR 24 HR Take 1 capsule by mouth every day. 90 capsule 1   • ibuprofen (MOTRIN) 600 MG Tab Take 600 mg by mouth every 6 hours as needed.     • multivitamin (THERAGRAN) Tab Take 1 Tab by mouth every day.     • Non Formulary Request every day. Takes supplements for health.     • loratadine/pseudo SR (CLARITIN D) 5-120 MG TABLET SR 12 HR Take 1 Tab by mouth 1 time daily as needed.     • acyclovir (ZOVIRAX) 400 MG tablet Take 400 mg by mouth 2 times a day as needed.     • acetaminophen (TYLENOL) 500 MG Tab Take 500-1,000 mg by mouth every 6 hours as needed.     • dicyclomine (BENTYL) 10 MG Cap Take 10 mg by mouth every 6 hours as needed.  2   • PROAIR  (90 Base) MCG/ACT Aero Soln inhalation aerosol Inhale 2 Puffs by mouth 4 times a day.  11   • pantoprazole (PROTONIX) 40 MG Tablet Delayed Response Take 40 mg by mouth every morning.  5     No current facility-administered medications for this visit.       Review Of Systems:    Constitutional - Negative for fatigue  Psychiatric - Positive for infrequent depression.  Negative for sleep problems, impaired concentration     Physical Examination:  Vital signs: There were no vitals taken for this visit.    Musculoskeletal: No abnormal movements.     Mental Status Evaluation:   General: Middle aged white male, dressed in casual attire, good grooming and hygiene, in no apparent distress, calm and cooperative, good eye contact, no psychomotor agitation or retardation  Orientation: Alert and oriented to person, place and time  Recent and remote memory: Grossly intact  Attention span and concentration: Grossly intact  Speech: Spontaneous, normal rate, rhythm and tone  Thought Process: Linear, logical and goal directed  Thought Content: Denies suicidal or homicidal ideations, intent or plan  Perception: Denies auditory or visual hallucinations. No delusions  "noted  Associations: Intact  Language: Appropriate  Fund of knowledge and vocabulary: Grossly adequate  Mood: \"fine\"  Affect: Euthymic, mood congruent  Insight: Good  Judgment: Good    Depression screening:  Depression Screen (PHQ-2/PHQ-9) 5/16/2019 12/4/2019 2/23/2021   PHQ-2 Total Score - - -   PHQ-2 Total Score - - 4   PHQ-2 Total Score 0 1 -   PHQ-9 Total Score - - -   PHQ-9 Total Score - - 6     Interpretation of PHQ-9 Total Score   Score Severity   1-4 No Depression   5-9 Mild Depression   10-14 Moderate Depression   15-19 Moderately Severe Depression   20-27 Severe Depression    Medical Records/Labs/Diagnostic Tests Reviewed:  [place] PDMP records - appropriate refills, no abuse suspected       Impression:  1.  ADHD, inattentive type - stable  2.  Major depressive disorder, recurrent, in full remission (with seasonal pattern, fall/winter onset) - stable    Plan:  1.  Continue Effexor XR 75 mg in the morning for depression  2.  Discontinue Concerta and start Adderall XR 30 mg in the morning for ADHD.  E prescribed for 2 months.  He is aware that it might require a prior authorization from his insurance.  Discussed side effects including GI distress, insomnia, decreased appetite, weight loss, headaches, palpitations etc.    Return to clinic in 2 months or sooner if symptoms worsen    The proposed treatment plan was discussed with the patient who was provided the opportunity to ask questions and make suggestions regarding alternative treatment. Patient verbalized understanding and expressed agreement with the plan.     Karo Romano M.D.  05/25/21    This note was created using voice recognition software (Dragon). The accuracy of the dictation is limited by the abilities of the software. I have reviewed the note prior to signing, however some errors in grammar and context are still possible. If you have any questions related to this note please do not hesitate to contact our office.   "

## 2021-07-01 ENCOUNTER — PATIENT MESSAGE (OUTPATIENT)
Dept: BEHAVIORAL HEALTH | Facility: CLINIC | Age: 66
End: 2021-07-01

## 2021-07-01 DIAGNOSIS — F90.0 ADHD, PREDOMINANTLY INATTENTIVE TYPE: ICD-10-CM

## 2021-07-01 RX ORDER — DEXTROAMPHETAMINE SACCHARATE, AMPHETAMINE ASPARTATE MONOHYDRATE, DEXTROAMPHETAMINE SULFATE AND AMPHETAMINE SULFATE 7.5; 7.5; 7.5; 7.5 MG/1; MG/1; MG/1; MG/1
30 CAPSULE, EXTENDED RELEASE ORAL EVERY MORNING
Qty: 30 CAPSULE | Refills: 0 | Status: SHIPPED | OUTPATIENT
Start: 2021-07-01 | End: 2021-07-31

## 2021-07-20 ENCOUNTER — TELEMEDICINE (OUTPATIENT)
Dept: BEHAVIORAL HEALTH | Facility: CLINIC | Age: 66
End: 2021-07-20
Payer: MEDICARE

## 2021-07-20 VITALS — BODY MASS INDEX: 34.44 KG/M2 | HEIGHT: 70 IN

## 2021-07-20 DIAGNOSIS — F90.0 ADHD, PREDOMINANTLY INATTENTIVE TYPE: ICD-10-CM

## 2021-07-20 DIAGNOSIS — F33.42 MDD (MAJOR DEPRESSIVE DISORDER), RECURRENT, IN FULL REMISSION (HCC): ICD-10-CM

## 2021-07-20 PROCEDURE — 99214 OFFICE O/P EST MOD 30 MIN: CPT | Mod: 95 | Performed by: PSYCHIATRY & NEUROLOGY

## 2021-07-20 RX ORDER — DEXTROAMPHETAMINE SACCHARATE, AMPHETAMINE ASPARTATE MONOHYDRATE, DEXTROAMPHETAMINE SULFATE AND AMPHETAMINE SULFATE 5; 5; 5; 5 MG/1; MG/1; MG/1; MG/1
20 CAPSULE, EXTENDED RELEASE ORAL 2 TIMES DAILY
Qty: 60 CAPSULE | Refills: 0 | Status: SHIPPED | OUTPATIENT
Start: 2021-08-18 | End: 2021-09-17

## 2021-07-20 RX ORDER — VENLAFAXINE HYDROCHLORIDE 75 MG/1
75 CAPSULE, EXTENDED RELEASE ORAL DAILY
Qty: 90 CAPSULE | Refills: 1 | Status: SHIPPED | OUTPATIENT
Start: 2021-07-20 | End: 2021-11-12 | Stop reason: SDUPTHER

## 2021-07-20 RX ORDER — DEXTROAMPHETAMINE SACCHARATE, AMPHETAMINE ASPARTATE MONOHYDRATE, DEXTROAMPHETAMINE SULFATE AND AMPHETAMINE SULFATE 5; 5; 5; 5 MG/1; MG/1; MG/1; MG/1
20 CAPSULE, EXTENDED RELEASE ORAL 2 TIMES DAILY
Qty: 60 CAPSULE | Refills: 0 | Status: SHIPPED | OUTPATIENT
Start: 2021-07-20 | End: 2021-08-19

## 2021-07-20 NOTE — PROGRESS NOTES
PSYCHIATRY VIRTUAL VISIT FOLLOW-UP NOTE      Chief Complaint   Patient presents with   • Follow-Up     ADHD, depression       This evaluation was conducted via Zoom using secure and encrypted videoconferencing technology. The patient was in a private location in the [facility].    The patient's identity was confirmed and verbal consent was obtained for this virtual visit.    History Of Present Illness:  Chris Garvin is a 66 y.o. male with ADHD, major depressive disorder, Layton's esophagus, IBS, GERD, sleep apnea comes in today for follow up, was last seen 2 months ago.  He is doing all right in regards to his depression and ADHD symptoms.  He has been taking Adderall since his last appointment and notices some benefit on his focus and concentration for a few hours in the morning but the days a struggle for him.  He has started taking his Adderall with food and abdominal discomfort has not been an issue.  He had some rough time as his wife was hospitalized twice.  He had some troubles with his sleep at that point but she is back home and doing better and he is doing better as well.  He denies any side effects that he has noticed from Adderall.  He denies having thoughts of wanting to hurt himself.    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     Substance Use:  Alcohol - Denies  Nicotine - Denies  Cannabis - Denies   Illicit drugs - Denies    Previous medication trials:  Wellbutrin (effective), Ritalin 20 mg TID (effective but problems with compliance due to TID dosing), Concerta 72 mg (effective, s/e - abdominal discomfort)    Medications:  Current Outpatient Medications   Medication Sig Dispense Refill   • mometasone (ASMANEX, 30 METERED DOSES,) 220 MCG/INH inhaler 220 mcg     • [START ON 8/18/2021] amphetamine-dextroamphetamine (ADDERALL XR) 20 MG  "per XR capsule Take 1 capsule by mouth 2 times a day for 30 days. 60 capsule 0   • amphetamine-dextroamphetamine (ADDERALL XR) 20 MG per XR capsule Take 1 capsule by mouth 2 times a day for 30 days. 60 capsule 0   • venlafaxine XR (EFFEXOR XR) 75 MG CAPSULE SR 24 HR Take 1 capsule by mouth every day. 90 capsule 1   • amphetamine-dextroamphetamine ER (ADDERALL XR) 30 MG XR capsule Take 1 capsule by mouth every morning for 30 days. 30 capsule 0   • ibuprofen (MOTRIN) 600 MG Tab Take 600 mg by mouth every 6 hours as needed.     • multivitamin (THERAGRAN) Tab Take 1 Tab by mouth every day.     • Non Formulary Request every day. Takes supplements for health.     • loratadine/pseudo SR (CLARITIN D) 5-120 MG TABLET SR 12 HR Take 1 Tab by mouth 1 time daily as needed.     • acyclovir (ZOVIRAX) 400 MG tablet Take 400 mg by mouth 2 times a day as needed.     • acetaminophen (TYLENOL) 500 MG Tab Take 500-1,000 mg by mouth every 6 hours as needed.     • dicyclomine (BENTYL) 10 MG Cap Take 10 mg by mouth every 6 hours as needed.  2   • PROAIR  (90 Base) MCG/ACT Aero Soln inhalation aerosol Inhale 2 Puffs by mouth 4 times a day.  11   • pantoprazole (PROTONIX) 40 MG Tablet Delayed Response Take 40 mg by mouth every morning.  5     No current facility-administered medications for this visit.       Review Of Systems:    Constitutional - Negative for fatigue  Psychiatric - Positive for infrequent depression, impaired concentration, sleep problems    Physical Examination:  Vital signs: Ht 1.778 m (5' 10\")   BMI 34.44 kg/m²     Musculoskeletal: No abnormal movements.     Mental Status Evaluation:   General: Middle aged white male, dressed in casual attire, good grooming and hygiene, in no apparent distress, calm and cooperative, good eye contact, no psychomotor agitation or retardation  Orientation: Alert and oriented to person, place and time  Recent and remote memory: Grossly intact  Attention span and concentration: Grossly " "intact  Speech: Spontaneous, normal rate, rhythm and tone  Thought Process: Linear, logical and goal directed  Thought Content: Denies suicidal or homicidal ideations, intent or plan  Perception: Denies auditory or visual hallucinations. No delusions noted  Associations: Intact  Language: Appropriate  Fund of knowledge and vocabulary: Grossly adequate  Mood: \"fine\"  Affect: Euthymic, mood congruent  Insight: Good  Judgment: Good    Depression screening:  Depression Screen (PHQ-2/PHQ-9) 5/16/2019 12/4/2019 2/23/2021   PHQ-2 Total Score - - -   PHQ-2 Total Score - - 4   PHQ-2 Total Score 0 1 -   PHQ-9 Total Score - - -   PHQ-9 Total Score - - 6     Interpretation of PHQ-9 Total Score   Score Severity   1-4 No Depression   5-9 Mild Depression   10-14 Moderate Depression   15-19 Moderately Severe Depression   20-27 Severe Depression    Medical Records/Labs/Diagnostic Tests Reviewed:  [place] PDMP records - appropriate refills, no abuse suspected       Impression:  1.  ADHD, inattentive type - worsening   2.  Major depressive disorder, recurrent, in full remission (with seasonal pattern, fall/winter onset) - stable    Plan:  1.  Continue Effexor XR 75 mg in the morning for depression  2.  Increase Adderall XR to 20 mg twice daily for ADHD.  E prescribed for 2 months.    Return to clinic in 2 months or sooner if symptoms worsen    The proposed treatment plan was discussed with the patient who was provided the opportunity to ask questions and make suggestions regarding alternative treatment. Patient verbalized understanding and expressed agreement with the plan.     Karo Romano M.D.  07/20/21    This note was created using voice recognition software (Dragon). The accuracy of the dictation is limited by the abilities of the software. I have reviewed the note prior to signing, however some errors in grammar and context are still possible. If you have any questions related to this note please do not hesitate to contact our " office.

## 2021-09-17 ENCOUNTER — TELEMEDICINE (OUTPATIENT)
Dept: BEHAVIORAL HEALTH | Facility: CLINIC | Age: 66
End: 2021-09-17
Payer: MEDICARE

## 2021-09-17 DIAGNOSIS — F33.42 MDD (MAJOR DEPRESSIVE DISORDER), RECURRENT, IN FULL REMISSION (HCC): ICD-10-CM

## 2021-09-17 DIAGNOSIS — F90.0 ADHD, PREDOMINANTLY INATTENTIVE TYPE: ICD-10-CM

## 2021-09-17 PROCEDURE — 90833 PSYTX W PT W E/M 30 MIN: CPT | Mod: 95 | Performed by: PSYCHIATRY & NEUROLOGY

## 2021-09-17 PROCEDURE — 99214 OFFICE O/P EST MOD 30 MIN: CPT | Mod: 95 | Performed by: PSYCHIATRY & NEUROLOGY

## 2021-09-17 RX ORDER — METHYLPHENIDATE HYDROCHLORIDE 20 MG/1
20 TABLET ORAL 3 TIMES DAILY
Qty: 90 TABLET | Refills: 0 | Status: SHIPPED | OUTPATIENT
Start: 2021-09-17 | End: 2021-10-17

## 2021-09-17 RX ORDER — METHYLPHENIDATE HYDROCHLORIDE 20 MG/1
20 TABLET ORAL 3 TIMES DAILY
Qty: 90 TABLET | Refills: 0 | Status: SHIPPED | OUTPATIENT
Start: 2021-10-16 | End: 2021-11-15

## 2021-09-17 RX ORDER — TAMSULOSIN HYDROCHLORIDE 0.4 MG/1
CAPSULE ORAL
COMMUNITY
Start: 2021-09-07 | End: 2023-03-23 | Stop reason: SDUPTHER

## 2021-09-17 NOTE — PROGRESS NOTES
PSYCHIATRY VIRTUAL VISIT FOLLOW-UP NOTE      Chief Complaint   Patient presents with   • Follow-Up     ADHD, depression       This evaluation was conducted via Zoom using secure and encrypted videoconferencing technology. The patient was in a private location in the [facility].    The patient's identity was confirmed and verbal consent was obtained for this virtual visit.    History Of Present Illness:  Chris Garvin is a 66 y.o. male with ADHD, major depressive disorder, Layton's esophagus, IBS, GERD, sleep apnea comes in today for follow up, was last seen 2 months ago.  He has had some struggles with depression mainly because of hot and smoky summer.  He was unable to enjoy this summer much.  He is also been worried about his wife who has been struggling and he is the primary caregiver.  He is avoiding travel as much as possible to make sure that he is taking care of her.  He has been compliant with Effexor.  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  He has been compliant with Adderall and is having abdominal discomfort like he had with Concerta.  He would like to go back on Ritalin which did not cause any GI issues.  He mentions the compliance was an issue at times but in terms of efficacy and side effects it was a lot better.  He is doing all right in regards to his sleep and appetite.  He denies having thoughts of wanting to hurt himself.    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     Substance Use:  Alcohol - Denies  Nicotine - Denies  Cannabis - Denies   Illicit drugs - Denies    Previous medication trials:  Wellbutrin (effective), Ritalin 20 mg TID (effective but problems with compliance due to TID dosing), Concerta 72 mg (effective, s/e - abdominal discomfort), Adderall XR 40 mg (s/e - abdominal  discomfort)    Medications:  Current Outpatient Medications   Medication Sig Dispense Refill   • tamsulosin (FLOMAX) 0.4 MG capsule 1 CAP(S) ORALLY ONCE A DAY 90 DAY(S)     • [START ON 10/16/2021] methylphenidate (RITALIN) 20 MG tablet Take 1 Tablet by mouth 3 times a day for 30 days. 90 Tablet 0   • methylphenidate (RITALIN) 20 MG tablet Take 1 Tablet by mouth 3 times a day for 30 days. 90 Tablet 0   • mometasone (ASMANEX, 30 METERED DOSES,) 220 MCG/INH inhaler 220 mcg     • amphetamine-dextroamphetamine (ADDERALL XR) 20 MG per XR capsule Take 1 capsule by mouth 2 times a day for 30 days. 60 capsule 0   • venlafaxine XR (EFFEXOR XR) 75 MG CAPSULE SR 24 HR Take 1 capsule by mouth every day. 90 capsule 1   • ibuprofen (MOTRIN) 600 MG Tab Take 600 mg by mouth every 6 hours as needed.     • loratadine/pseudo SR (CLARITIN D) 5-120 MG TABLET SR 12 HR Take 1 Tab by mouth 1 time daily as needed.     • acyclovir (ZOVIRAX) 400 MG tablet Take 400 mg by mouth 2 times a day as needed.     • acetaminophen (TYLENOL) 500 MG Tab Take 500-1,000 mg by mouth every 6 hours as needed.     • dicyclomine (BENTYL) 10 MG Cap Take 10 mg by mouth every 6 hours as needed.  2   • PROAIR  (90 Base) MCG/ACT Aero Soln inhalation aerosol Inhale 2 Puffs by mouth 4 times a day.  11   • pantoprazole (PROTONIX) 40 MG Tablet Delayed Response Take 40 mg by mouth every morning.  5   • multivitamin (THERAGRAN) Tab Take 1 Tab by mouth every day.     • Non Formulary Request every day. Takes supplements for health.       No current facility-administered medications for this visit.       Review Of Systems:    Constitutional - Negative for fatigue  Psychiatric - Positive for infrequent depression, impaired concentration, sleep problems    Physical Examination:  Vital signs: There were no vitals taken for this visit.    Musculoskeletal: No abnormal movements.     Mental Status Evaluation:   General: Middle aged white male, dressed in casual attire, good  "grooming and hygiene, in no apparent distress, calm and cooperative, good eye contact, no psychomotor agitation or retardation  Orientation: Alert and oriented to person, place and time  Recent and remote memory: Grossly intact  Attention span and concentration: Grossly intact  Speech: Spontaneous, normal rate, rhythm and tone  Thought Process: Linear, logical and goal directed  Thought Content: Denies suicidal or homicidal ideations, intent or plan  Perception: Denies auditory or visual hallucinations. No delusions noted  Associations: Intact  Language: Appropriate  Fund of knowledge and vocabulary: Grossly adequate  Mood: \"fine\"  Affect: Euthymic, mood congruent  Insight: Good  Judgment: Good    Depression screening:  Depression Screen (PHQ-2/PHQ-9) 5/16/2019 12/4/2019 2/23/2021   PHQ-2 Total Score - - -   PHQ-2 Total Score - - 4   PHQ-2 Total Score 0 1 -   PHQ-9 Total Score - - -   PHQ-9 Total Score - - 6     Interpretation of PHQ-9 Total Score   Score Severity   1-4 No Depression   5-9 Mild Depression   10-14 Moderate Depression   15-19 Moderately Severe Depression   20-27 Severe Depression    Medical Records/Labs/Diagnostic Tests Reviewed:  [place] PDMP records - appropriate refills, no abuse suspected       Impression:  1.  ADHD, inattentive type - stable  2.  Major depressive disorder, recurrent, in full remission (with seasonal pattern, fall/winter onset) - slight worsening     Plan:  1.  Continue Effexor XR 75 mg in the morning for depression.  I have advised him to start using light box once the time changes.  2.  Discontinue Adderall XR due to GI side effects  3.  Start Ritalin 20 mg 3 times daily for ADHD.  E prescribed for 2 months.  4.  Provided supportive psychotherapy (> 16 minutes).  Provided support in regards to struggles this summer and worries about his wife's health.  Discussed caregiver fatigue and encouraged him to find time to do little things for himself.  Advised self care.    Return to clinic " in 2 months or sooner if symptoms worsen    The proposed treatment plan was discussed with the patient who was provided the opportunity to ask questions and make suggestions regarding alternative treatment. Patient verbalized understanding and expressed agreement with the plan.     Karo Romano M.D.  09/17/21    This note was created using voice recognition software (Dragon). The accuracy of the dictation is limited by the abilities of the software. I have reviewed the note prior to signing, however some errors in grammar and context are still possible. If you have any questions related to this note please do not hesitate to contact our office.

## 2021-11-12 ENCOUNTER — TELEMEDICINE (OUTPATIENT)
Dept: BEHAVIORAL HEALTH | Facility: CLINIC | Age: 66
End: 2021-11-12
Payer: MEDICARE

## 2021-11-12 VITALS — BODY MASS INDEX: 34.44 KG/M2 | HEIGHT: 70 IN

## 2021-11-12 DIAGNOSIS — F33.42 MDD (MAJOR DEPRESSIVE DISORDER), RECURRENT, IN FULL REMISSION (HCC): ICD-10-CM

## 2021-11-12 DIAGNOSIS — F33.8 SEASONAL AFFECTIVE DISORDER (HCC): ICD-10-CM

## 2021-11-12 DIAGNOSIS — F90.0 ADHD, PREDOMINANTLY INATTENTIVE TYPE: ICD-10-CM

## 2021-11-12 PROCEDURE — 90833 PSYTX W PT W E/M 30 MIN: CPT | Mod: 95 | Performed by: PSYCHIATRY & NEUROLOGY

## 2021-11-12 PROCEDURE — 99214 OFFICE O/P EST MOD 30 MIN: CPT | Mod: 95 | Performed by: PSYCHIATRY & NEUROLOGY

## 2021-11-12 RX ORDER — METHYLPHENIDATE HYDROCHLORIDE 20 MG/1
20 TABLET ORAL 3 TIMES DAILY
Qty: 90 TABLET | Refills: 0 | Status: SHIPPED | OUTPATIENT
Start: 2022-01-11 | End: 2022-02-10

## 2021-11-12 RX ORDER — METHYLPHENIDATE HYDROCHLORIDE 20 MG/1
20 TABLET ORAL 3 TIMES DAILY
Qty: 90 TABLET | Refills: 0 | Status: SHIPPED | OUTPATIENT
Start: 2021-12-13 | End: 2022-01-12

## 2021-11-12 RX ORDER — METHYLPHENIDATE HYDROCHLORIDE 20 MG/1
20 TABLET ORAL 3 TIMES DAILY
Qty: 90 TABLET | Refills: 0 | Status: SHIPPED | OUTPATIENT
Start: 2021-11-14 | End: 2021-12-14

## 2021-11-12 RX ORDER — VENLAFAXINE HYDROCHLORIDE 75 MG/1
75 CAPSULE, EXTENDED RELEASE ORAL DAILY
Qty: 90 CAPSULE | Refills: 0 | Status: SHIPPED | OUTPATIENT
Start: 2021-11-12 | End: 2022-02-07 | Stop reason: SDUPTHER

## 2021-11-12 NOTE — PROGRESS NOTES
PSYCHIATRY VIRTUAL VISIT FOLLOW-UP NOTE      Chief Complaint   Patient presents with   • Follow-Up     ADHD, depression       This evaluation was conducted via Zoom using secure and encrypted videoconferencing technology. The patient was in a private location in the [facility].    The patient's identity was confirmed and verbal consent was obtained for this virtual visit.    History Of Present Illness:  Chris Garvin is a 66 y.o. male with ADHD, major depressive disorder, Layton's esophagus, IBS, GERD, sleep apnea comes in today for follow up, was last seen 2 months ago.  He has been in regards to his ADHD since he has been back on Ritalin.  He is not endorsing side effects that he noticed with Concerta and Adderall XR.  He is making sure that anytime he takes Ritalin he eats something with it to minimize side effects.  He is doing better in regards to his depression.  He is trying to stay active as much as possible.  He has not started using his light therapy since he has been feeling good.  He continues to worry about his wife who is having a lot of health issues.  He does not travel anymore so that he can be there for his wife.  He denies any other psychosocial stressors.  She denies having thoughts of wanting to hurt himself.    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 90 Tablet 0   • mometasone (ASMANEX, 30 METERED DOSES,) 220 MCG/INH inhaler 220 mcg     • venlafaxine XR (EFFEXOR XR) 75 MG CAPSULE SR 24 HR Take 1 capsule by mouth every day. 90 capsule 1   • ibuprofen (MOTRIN) 600 MG Tab Take 600 mg by mouth every 6 hours as needed.     • multivitamin (THERAGRAN) Tab Take 1 Tab by mouth every day.     • Non Formulary Request every day. Takes supplements for health.     • loratadine/pseudo SR (CLARITIN D) 5-120 MG TABLET SR 12 HR Take 1 Tab by mouth 1 time daily as needed.     • acyclovir (ZOVIRAX) 400 MG tablet Take 400 mg by mouth 2 times a day as needed.     • acetaminophen (TYLENOL) 500 MG Tab Take 500-1,000 mg by mouth every 6 hours as needed.     • dicyclomine (BENTYL) 10 MG Cap Take 10 mg by mouth every 6 hours as needed.  2   • PROAIR  (90 Base) MCG/ACT Aero Soln inhalation aerosol Inhale 2 Puffs by mouth 4 times a day.  11   • pantoprazole (PROTONIX) 40 MG Tablet Delayed Response Take 40 mg by mouth every morning.  5     No current facility-administered medications for this visit.       Review Of Systems:    Constitutional - Negative for fatigue  Psychiatric - Positive for infrequent depression, sleep problems    Physical Examination:  Vital signs: Ht 1.778 m (5' 10\")   BMI 34.44 kg/m²     Musculoskeletal: No abnormal movements.     Mental Status Evaluation:   General: Middle aged white male, dressed in casual attire, good grooming and hygiene, in no apparent distress, calm and cooperative, good eye contact, no psychomotor agitation or retardation  Orientation: Alert and oriented to person, place and time  Recent and remote memory: Grossly intact  Attention span and concentration: Grossly intact  Speech: Spontaneous, normal rate, rhythm and tone  Thought Process: Linear, logical and goal directed  Thought Content: Denies suicidal or homicidal " "ideations, intent or plan  Perception: Denies auditory or visual hallucinations. No delusions noted  Associations: Intact  Language: Appropriate  Fund of knowledge and vocabulary: Grossly adequate  Mood: \"fine\"  Affect: Euthymic, mood congruent  Insight: Good  Judgment: Good    Depression screening:  Depression Screen (PHQ-2/PHQ-9) 5/16/2019 12/4/2019 2/23/2021   PHQ-2 Total Score - - -   PHQ-2 Total Score - - 4   PHQ-2 Total Score 0 1 -   PHQ-9 Total Score - - -   PHQ-9 Total Score - - 6     Interpretation of PHQ-9 Total Score   Score Severity   1-4 No Depression   5-9 Mild Depression   10-14 Moderate Depression   15-19 Moderately Severe Depression   20-27 Severe Depression    Medical Records/Labs/Diagnostic Tests Reviewed:  [place] PDMP records - appropriate refills, no abuse suspected       Impression:  1.  ADHD, inattentive type - stable  2.  Major depressive disorder, recurrent, in full remission (seasonal pattern, fall/winter onset) - improving    Plan:  1.  Continue Effexor XR 75 mg in the morning for depression.  2.  Encouraged him to start using light therapy if he notices worsening depression in the next few months  3.  Continue Ritalin 20 mg 3 times daily for ADHD.  E prescribed for 3 months.  4.  Provided supportive psychotherapy (> 16 minutes).  Discussed stress related to his wife's illness and encouraged him to stay positive and hopeful.  Advised self care.    Return to clinic in 3 months or sooner if symptoms worsen    The proposed treatment plan was discussed with the patient who was provided the opportunity to ask questions and make suggestions regarding alternative treatment. Patient verbalized understanding and expressed agreement with the plan.     Karo Romano M.D.  11/12/21    This note was created using voice recognition software (Dragon). The accuracy of the dictation is limited by the abilities of the software. I have reviewed the note prior to signing, however some errors in grammar and " context are still possible. If you have any questions related to this note please do not hesitate to contact our office.    none

## 2022-02-07 ENCOUNTER — TELEMEDICINE (OUTPATIENT)
Dept: BEHAVIORAL HEALTH | Facility: CLINIC | Age: 67
End: 2022-02-07
Payer: MEDICARE

## 2022-02-07 DIAGNOSIS — F33.8 SEASONAL AFFECTIVE DISORDER (HCC): ICD-10-CM

## 2022-02-07 DIAGNOSIS — F33.42 MDD (MAJOR DEPRESSIVE DISORDER), RECURRENT, IN FULL REMISSION (HCC): ICD-10-CM

## 2022-02-07 DIAGNOSIS — F90.0 ADHD, PREDOMINANTLY INATTENTIVE TYPE: ICD-10-CM

## 2022-02-07 PROCEDURE — 99214 OFFICE O/P EST MOD 30 MIN: CPT | Mod: 95 | Performed by: PSYCHIATRY & NEUROLOGY

## 2022-02-07 PROCEDURE — 90833 PSYTX W PT W E/M 30 MIN: CPT | Mod: 95 | Performed by: PSYCHIATRY & NEUROLOGY

## 2022-02-07 RX ORDER — VENLAFAXINE HYDROCHLORIDE 75 MG/1
75 CAPSULE, EXTENDED RELEASE ORAL DAILY
Qty: 90 CAPSULE | Refills: 0 | Status: SHIPPED | OUTPATIENT
Start: 2022-02-07 | End: 2022-05-06 | Stop reason: SDUPTHER

## 2022-02-07 RX ORDER — METHYLPHENIDATE HYDROCHLORIDE 20 MG/1
20 TABLET ORAL 3 TIMES DAILY
Qty: 90 TABLET | Refills: 0 | Status: SHIPPED | OUTPATIENT
Start: 2022-04-08 | End: 2022-05-08

## 2022-02-07 RX ORDER — METHYLPHENIDATE HYDROCHLORIDE 20 MG/1
20 TABLET ORAL 3 TIMES DAILY
Qty: 90 TABLET | Refills: 0 | Status: SHIPPED | OUTPATIENT
Start: 2022-03-10 | End: 2022-04-09

## 2022-02-07 RX ORDER — METHYLPHENIDATE HYDROCHLORIDE 20 MG/1
20 TABLET ORAL 3 TIMES DAILY
Qty: 90 TABLET | Refills: 0 | Status: SHIPPED | OUTPATIENT
Start: 2022-02-09 | End: 2022-03-11

## 2022-02-07 ASSESSMENT — PATIENT HEALTH QUESTIONNAIRE - PHQ9
CLINICAL INTERPRETATION OF PHQ2 SCORE: 1
5. POOR APPETITE OR OVEREATING: 1 - SEVERAL DAYS
SUM OF ALL RESPONSES TO PHQ QUESTIONS 1-9: 7

## 2022-02-07 NOTE — PROGRESS NOTES
PSYCHIATRY VIRTUAL VISIT FOLLOW-UP NOTE      Chief Complaint   Patient presents with   • Follow-Up     ADHD, depression       This evaluation was conducted via Zoom using secure and encrypted videoconferencing technology. The patient was in their home in the [facility].    The patient's identity was confirmed and verbal consent was obtained for this virtual visit.    History Of Present Illness:  Chris Garvin is a 66 y.o. male with ADHD, major depressive disorder, Layton's esophagus, IBS, GERD, sleep apnea comes in today for follow up, was last seen 3 months ago.  He is doing all right in regards to his depression and ADHD.  He had a tough time during Syl[place] as he misses having an extended family.  He continues to have a good relationship with his wife but does worry about her health.  He has been compliant with Effexor and Ritalin and denies any side effects.  He has been going skiing which has been good for his depression.  He continues to be active in his [employer].  He denies having thoughts of wanting to hurt himself.    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 90 Tablet 0   • venlafaxine XR (EFFEXOR XR) 75 MG CAPSULE SR 24 HR Take 1 Capsule by mouth every day. 90 Capsule 0   • tamsulosin (FLOMAX) 0.4 MG capsule 1 CAP(S) ORALLY ONCE A DAY 90 DAY(S)     • mometasone (ASMANEX, 30 METERED DOSES,) 220  "MCG/INH inhaler 220 mcg     • ibuprofen (MOTRIN) 600 MG Tab Take 600 mg by mouth every 6 hours as needed.     • multivitamin (THERAGRAN) Tab Take 1 Tab by mouth every day.     • loratadine/pseudo SR (CLARITIN D) 5-120 MG TABLET SR 12 HR Take 1 Tab by mouth 1 time daily as needed.     • acyclovir (ZOVIRAX) 400 MG tablet Take 400 mg by mouth 2 times a day as needed.     • acetaminophen (TYLENOL) 500 MG Tab Take 500-1,000 mg by mouth every 6 hours as needed.     • dicyclomine (BENTYL) 10 MG Cap Take 10 mg by mouth every 6 hours as needed.  2   • PROAIR  (90 Base) MCG/ACT Aero Soln inhalation aerosol Inhale 2 Puffs by mouth 4 times a day.  11   • pantoprazole (PROTONIX) 40 MG Tablet Delayed Response Take 40 mg by mouth every morning.  5     No current facility-administered medications for this visit.       Review Of Systems:    Constitutional - Negative for fatigue  Psychiatric - Positive for infrequent depression, sleep problems    Physical Examination:  Vital signs: There were no vitals taken for this visit.    Musculoskeletal: No abnormal movements.     Mental Status Evaluation:   General: Middle aged white male, dressed in casual attire, good grooming and hygiene, in no apparent distress, calm and cooperative, good eye contact, no psychomotor agitation or retardation  Orientation: Alert and oriented to person, place and time  Recent and remote memory: Grossly intact  Attention span and concentration: Grossly intact  Speech: Spontaneous, normal rate, rhythm and tone  Thought Process: Linear, logical and goal directed  Thought Content: Denies suicidal or homicidal ideations, intent or plan  Perception: No delusions noted  Associations: Intact  Language: Appropriate  Fund of knowledge and vocabulary: Grossly adequate  Mood: \"just peachy\"  Affect: Euthymic, mood congruent  Insight: Good  Judgment: Good    Depression screening:  Depression Screen (PHQ-2/PHQ-9) 12/4/2019 2/23/2021 2/7/2022   PHQ-2 Total Score - - - "   PHQ-2 Total Score - 4 -   PHQ-2 Total Score 1 - 1   PHQ-9 Total Score - - -   PHQ-9 Total Score - 6 -   PHQ-9 Total Score - - 7     Interpretation of PHQ-9 Total Score   Score Severity   1-4 No Depression   5-9 Mild Depression   10-14 Moderate Depression   15-19 Moderately Severe Depression   20-27 Severe Depression    Medical Records/Labs/Diagnostic Tests Reviewed:  [place] PDMP records - appropriate refills, no abuse suspected       Impression:  1.  ADHD, inattentive type - stable  2.  Major depressive disorder, recurrent, in full remission - improving  3.  Seasonal affective disorder - improving    Plan:  1.  Continue Effexor XR 75 mg in the morning for depression.  2.  Continue Ritalin 20 mg 3 times daily for ADHD.  E-prescribed for 3 months.  3.  Provided supportive psychotherapy (> 16 minutes).  Provided support in regards to missing family especially around holidays.  Discussed how it is easy to feel bad about something that he does not have rather than feeling proud of what he has which includes a good relationship with his wife.  Advised continued self-care.    Return to clinic in 3 months or sooner if symptoms worsen    The proposed treatment plan was discussed with the patient who was provided the opportunity to ask questions and make suggestions regarding alternative treatment. Patient verbalized understanding and expressed agreement with the plan.     Karo Romano M.D.  02/07/22    This note was created using voice recognition software (Dragon). The accuracy of the dictation is limited by the abilities of the software. I have reviewed the note prior to signing, however some errors in grammar and context are still possible. If you have any questions related to this note please do not hesitate to contact our office.

## 2022-05-06 ENCOUNTER — TELEMEDICINE (OUTPATIENT)
Dept: BEHAVIORAL HEALTH | Facility: CLINIC | Age: 67
End: 2022-05-06
Payer: MEDICARE

## 2022-05-06 DIAGNOSIS — F33.42 MDD (MAJOR DEPRESSIVE DISORDER), RECURRENT, IN FULL REMISSION (HCC): ICD-10-CM

## 2022-05-06 DIAGNOSIS — F90.0 ADHD, PREDOMINANTLY INATTENTIVE TYPE: ICD-10-CM

## 2022-05-06 PROCEDURE — 99214 OFFICE O/P EST MOD 30 MIN: CPT | Mod: 95 | Performed by: PSYCHIATRY & NEUROLOGY

## 2022-05-06 RX ORDER — METHYLPHENIDATE HYDROCHLORIDE 20 MG/1
20 TABLET ORAL 3 TIMES DAILY
Qty: 90 TABLET | Refills: 0 | Status: SHIPPED | OUTPATIENT
Start: 2022-07-03 | End: 2022-08-02

## 2022-05-06 RX ORDER — METHYLPHENIDATE HYDROCHLORIDE 20 MG/1
20 TABLET ORAL 3 TIMES DAILY
Qty: 90 TABLET | Refills: 0 | Status: SHIPPED | OUTPATIENT
Start: 2022-05-06 | End: 2022-06-05

## 2022-05-06 RX ORDER — VENLAFAXINE HYDROCHLORIDE 75 MG/1
75 CAPSULE, EXTENDED RELEASE ORAL DAILY
Qty: 90 CAPSULE | Refills: 0 | Status: SHIPPED | OUTPATIENT
Start: 2022-05-06 | End: 2022-08-02 | Stop reason: SDUPTHER

## 2022-05-06 RX ORDER — METHYLPHENIDATE HYDROCHLORIDE 20 MG/1
20 TABLET ORAL 3 TIMES DAILY
Qty: 90 TABLET | Refills: 0 | Status: SHIPPED | OUTPATIENT
Start: 2022-06-04 | End: 2022-07-04

## 2022-05-06 RX ORDER — LORATADINE 10 MG/1
CAPSULE, LIQUID FILLED ORAL
COMMUNITY

## 2022-05-06 NOTE — PROGRESS NOTES
PSYCHIATRY VIRTUAL VISIT FOLLOW-UP NOTE      Chief Complaint   Patient presents with   • Follow-Up     ADHD, depression       This evaluation was conducted via Zoom using secure and encrypted videoconferencing technology.   The patient was in their home in the [facility].    The patient's identity was confirmed and verbal consent was obtained for this virtual visit.    History Of Present Illness:  Chris Garvin is a 66 y.o. male with ADHD, major depressive disorder, Layton's esophagus, IBS, GERD, asthma, sleep apnea comes in today for follow up, was last seen 3 months ago.  He has been doing good in regards to both ADHD and depression.  He is compliant with Ritalin and Effexor and is endorsing benefit from both medications.  He still worries about his wife's health but she is doing better lately.  He continues to stay busy with [employer] and volunteer work.  He denies having thoughts of wanting of hurt himself.     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 90 Tablet 0   • venlafaxine XR (EFFEXOR XR) 75 MG CAPSULE SR 24 HR Take 1 Capsule by mouth every day. 90 Capsule 0   • tamsulosin (FLOMAX) 0.4 MG capsule 1 CAP(S) ORALLY ONCE A DAY 90 DAY(S)     • mometasone (ASMANEX, 30 METERED DOSES,) 220 MCG/INH inhaler 220 mcg     • ibuprofen (MOTRIN) 600 MG Tab Take 600 mg by mouth every 6 hours as needed.     •  "multivitamin (THERAGRAN) Tab Take 1 Tab by mouth every day.     • loratadine/pseudo SR (CLARITIN D) 5-120 MG TABLET SR 12 HR Take 1 Tab by mouth 1 time daily as needed.     • acyclovir (ZOVIRAX) 400 MG tablet Take 400 mg by mouth 2 times a day as needed.     • acetaminophen (TYLENOL) 500 MG Tab Take 500-1,000 mg by mouth every 6 hours as needed.     • dicyclomine (BENTYL) 10 MG Cap Take 10 mg by mouth every 6 hours as needed.  2   • PROAIR  (90 Base) MCG/ACT Aero Soln inhalation aerosol Inhale 2 Puffs by mouth 4 times a day.  11   • pantoprazole (PROTONIX) 40 MG Tablet Delayed Response Take 40 mg by mouth every morning.  5     No current facility-administered medications for this visit.     Review Of Systems:    Psychiatric - Positive for infrequent depression    Physical Examination:  Vital signs: There were no vitals taken for this visit.    Musculoskeletal: No abnormal movements.     Mental Status Evaluation:   General: Middle aged white male, dressed in casual attire, good grooming and hygiene, in no apparent distress, calm and cooperative, good eye contact, no psychomotor agitation or retardation  Orientation: Alert and oriented to person, place and time  Recent and remote memory: Grossly intact  Attention span and concentration: Grossly intact  Speech: Spontaneous, normal rate, rhythm and tone  Thought Process: Linear, logical and goal directed  Thought Content: Denies suicidal or homicidal ideations, intent or plan  Perception: No delusions noted  Associations: Intact  Language: Appropriate  Fund of knowledge and vocabulary: Grossly adequate  Mood: \"good\"  Affect: Euthymic, mood congruent  Insight: Good  Judgment: Good    Depression screening:  Depression Screen (PHQ-2/PHQ-9) 12/4/2019 2/23/2021 2/7/2022   PHQ-2 Total Score - - -   PHQ-2 Total Score - 4 -   PHQ-2 Total Score 1 - 1   PHQ-9 Total Score - - -   PHQ-9 Total Score - 6 -   PHQ-9 Total Score - - 7     Interpretation of PHQ-9 Total Score "   Score Severity   1-4 No Depression   5-9 Mild Depression   10-14 Moderate Depression   15-19 Moderately Severe Depression   20-27 Severe Depression    Medical Records/Labs/Diagnostic Tests Reviewed:  [place] PDMP records - appropriate refills, no abuse suspected       Impression:  1.  ADHD, inattentive type - stable  2.  Major depressive disorder, recurrent, in full remission (with seasonal component, worse in [place]) - stable  3.  Seasonal affective disorder - resolved     Plan:  1.  Continue Effexor XR 75 mg in the morning for depression.  2.  Continue Ritalin 20 mg 3 times daily for ADHD.  E-prescribed for 3 months.    Return to clinic in 3 months or sooner if symptoms worsen    The proposed treatment plan was discussed with the patient who was provided the opportunity to ask questions and make suggestions regarding alternative treatment. Patient verbalized understanding and expressed agreement with the plan.     Karo Romano M.D.  05/06/22    This note was created using voice recognition software (Dragon). The accuracy of the dictation is limited by the abilities of the software. I have reviewed the note prior to signing, however some errors in grammar and context are still possible. If you have any questions related to this note please do not hesitate to contact our office.

## 2022-08-02 ENCOUNTER — TELEMEDICINE (OUTPATIENT)
Dept: BEHAVIORAL HEALTH | Facility: CLINIC | Age: 67
End: 2022-08-02
Payer: MEDICARE

## 2022-08-02 DIAGNOSIS — F90.0 ADHD, PREDOMINANTLY INATTENTIVE TYPE: ICD-10-CM

## 2022-08-02 DIAGNOSIS — F33.42 MDD (MAJOR DEPRESSIVE DISORDER), RECURRENT, IN FULL REMISSION (HCC): ICD-10-CM

## 2022-08-02 PROCEDURE — 99214 OFFICE O/P EST MOD 30 MIN: CPT | Mod: 95 | Performed by: PSYCHIATRY & NEUROLOGY

## 2022-08-02 PROCEDURE — 90833 PSYTX W PT W E/M 30 MIN: CPT | Mod: 95 | Performed by: PSYCHIATRY & NEUROLOGY

## 2022-08-02 RX ORDER — METHYLPHENIDATE HYDROCHLORIDE 20 MG/1
20 TABLET ORAL 3 TIMES DAILY
Qty: 90 TABLET | Refills: 0 | Status: SHIPPED | OUTPATIENT
Start: 2022-10-02 | End: 2022-10-13 | Stop reason: SDUPTHER

## 2022-08-02 RX ORDER — VENLAFAXINE HYDROCHLORIDE 75 MG/1
75 CAPSULE, EXTENDED RELEASE ORAL DAILY
Qty: 90 CAPSULE | Refills: 0 | Status: SHIPPED | OUTPATIENT
Start: 2022-08-02 | End: 2022-11-01 | Stop reason: SDUPTHER

## 2022-08-02 RX ORDER — METHYLPHENIDATE HYDROCHLORIDE 20 MG/1
20 TABLET ORAL 3 TIMES DAILY
Qty: 90 TABLET | Refills: 0 | Status: SHIPPED | OUTPATIENT
Start: 2022-09-03 | End: 2022-09-21 | Stop reason: SDUPTHER

## 2022-08-02 RX ORDER — METHYLPHENIDATE HYDROCHLORIDE 20 MG/1
20 TABLET ORAL 3 TIMES DAILY
Qty: 90 TABLET | Refills: 0 | Status: SHIPPED | OUTPATIENT
Start: 2022-08-05 | End: 2022-09-04

## 2022-08-02 RX ORDER — INDOMETHACIN 25 MG/1
CAPSULE ORAL
COMMUNITY
Start: 2022-07-03 | End: 2022-11-01

## 2022-08-02 NOTE — PROGRESS NOTES
PSYCHIATRY VIRTUAL VISIT FOLLOW-UP NOTE      Chief Complaint   Patient presents with   • Follow-Up     ADHD, depression       This evaluation was conducted via Zoom using secure and encrypted videoconferencing technology.   The patient was in their home in the [facility].    The patient's identity was confirmed and verbal consent was obtained for this virtual visit.    History Of Present Illness:  Chris Garvin is a 67 y.o. male with ADHD, major depressive disorder, Layton's esophagus, IBS, GERD, asthma, sleep apnea comes in today for follow up, was last seen 3 months ago.  Has been doing all right in regards to his ADHD but has had some struggles with his mood.  He has had a few deaths recently which have been affecting him.  He lost a friend to suicide which has been really tough.  He has also been dealing with Neurology.  He has been compliant with Ritalin and it seems to be helping his symptoms.  He has previously used Vyvanse which was helpful for him as well.  He continues to take care of his wife who has pain issues.  He denies having thoughts of wanting to hurt himself.    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 90 Tablet 0   • Loratadine (CLARITIN) 10 MG Cap Take  by mouth.     • venlafaxine XR (EFFEXOR XR) 75 MG CAPSULE SR 24 HR Take 1 Capsule by mouth every day. 90 Capsule 0   • tamsulosin  "(FLOMAX) 0.4 MG capsule 1 CAP(S) ORALLY ONCE A DAY 90 DAY(S)     • multivitamin (THERAGRAN) Tab Take 1 Tab by mouth every day.     • dicyclomine (BENTYL) 10 MG Cap Take 10 mg by mouth every 6 hours as needed.  2   • PROAIR  (90 Base) MCG/ACT Aero Soln inhalation aerosol Inhale 2 Puffs by mouth 4 times a day.  11   • pantoprazole (PROTONIX) 40 MG Tablet Delayed Response Take 40 mg by mouth every morning.  5     No current facility-administered medications for this visit.     Review Of Systems:    Neurological - Positive for headaches  Psychiatric - Positive for infrequent depression    Physical Examination:  Vital signs: There were no vitals taken for this visit.    Musculoskeletal: No abnormal movements.     Mental Status Evaluation:   General: Middle aged white male, dressed in casual attire, good grooming and hygiene, in no apparent distress, calm and cooperative, good eye contact, no psychomotor agitation or retardation  Orientation: Alert and oriented to person, place and time  Recent and remote memory: Grossly intact  Attention span and concentration: Grossly intact  Speech: Spontaneous, normal rate, rhythm and tone  Thought Process: Linear, logical and goal directed  Thought Content: Denies suicidal or homicidal ideations, intent or plan  Perception: No delusions noted  Associations: Intact  Language: Appropriate  Fund of knowledge and vocabulary: Grossly adequate  Mood: \"good\"  Affect: Euthymic, mood congruent  Insight: Good  Judgment: Good    Depression screening:  Depression Screen (PHQ-2/PHQ-9) 12/4/2019 2/23/2021 2/7/2022   PHQ-2 Total Score - - -   PHQ-2 Total Score - 4 -   PHQ-2 Total Score 1 - 1   PHQ-9 Total Score - - -   PHQ-9 Total Score - 6 -   PHQ-9 Total Score - - 7     Interpretation of PHQ-9 Total Score   Score Severity   1-4 No Depression   5-9 Mild Depression   10-14 Moderate Depression   15-19 Moderately Severe Depression   20-27 Severe Depression    Medical Records/Labs/Diagnostic " Tests Reviewed:  [place] PDMP records - appropriate refills, no abuse suspected       Impression:  1.  ADHD, inattentive type - stable  2.  Major depressive disorder, recurrent, in full remission (with seasonal component, worse in bazan) - stable    Plan:  1.  Continue Effexor XR 75 mg in the morning for depression.  2.  Continue Ritalin 20 mg 3 times daily for ADHD.  E-prescribed for 3 months.  Encouraged him to continue Ritalin since he is working with Neurology for management of headaches.  Will discuss Vyvanse at his next follow-up appointment.  3.  Provided supportive psychotherapy (> 16 minutes).  Provided support in regards to loss of friends in the last few months.  Encouraged him to take care of himself and to engage in his hobbies that he is taking care of his mental health.  Discussed the better he does with his health, better he can take care of his wife.    Return to clinic in 3 months or sooner if symptoms worsen    The proposed treatment plan was discussed with the patient who was provided the opportunity to ask questions and make suggestions regarding alternative treatment. Patient verbalized understanding and expressed agreement with the plan.     Karo Romano M.D.  08/02/22    This note was created using voice recognition software (Dragon). The accuracy of the dictation is limited by the abilities of the software. I have reviewed the note prior to signing, however some errors in grammar and context are still possible. If you have any questions related to this note please do not hesitate to contact our office.

## 2022-08-23 ENCOUNTER — PATIENT MESSAGE (OUTPATIENT)
Dept: BEHAVIORAL HEALTH | Facility: CLINIC | Age: 67
End: 2022-08-23
Payer: COMMERCIAL

## 2022-08-23 DIAGNOSIS — F90.0 ADHD (ATTENTION DEFICIT HYPERACTIVITY DISORDER), INATTENTIVE TYPE: ICD-10-CM

## 2022-08-23 RX ORDER — METHYLPHENIDATE HYDROCHLORIDE 20 MG/1
20 TABLET ORAL 3 TIMES DAILY
Qty: 21 TABLET | Refills: 0 | Status: SHIPPED | OUTPATIENT
Start: 2022-08-28 | End: 2022-09-04

## 2022-09-21 ENCOUNTER — PATIENT MESSAGE (OUTPATIENT)
Dept: BEHAVIORAL HEALTH | Facility: CLINIC | Age: 67
End: 2022-09-21
Payer: COMMERCIAL

## 2022-09-21 DIAGNOSIS — F90.0 ADHD, PREDOMINANTLY INATTENTIVE TYPE: ICD-10-CM

## 2022-09-21 RX ORDER — METHYLPHENIDATE HYDROCHLORIDE 20 MG/1
20 TABLET ORAL 3 TIMES DAILY
Qty: 30 TABLET | Refills: 0 | Status: SHIPPED | OUTPATIENT
Start: 2022-09-21 | End: 2022-10-01

## 2022-10-13 ENCOUNTER — PATIENT MESSAGE (OUTPATIENT)
Dept: BEHAVIORAL HEALTH | Facility: CLINIC | Age: 67
End: 2022-10-13
Payer: COMMERCIAL

## 2022-10-13 DIAGNOSIS — F90.0 ADHD, PREDOMINANTLY INATTENTIVE TYPE: ICD-10-CM

## 2022-10-13 RX ORDER — METHYLPHENIDATE HYDROCHLORIDE 20 MG/1
20 TABLET ORAL 3 TIMES DAILY
Qty: 10 TABLET | Refills: 0 | Status: SHIPPED | OUTPATIENT
Start: 2022-10-28 | End: 2022-11-01

## 2022-10-13 NOTE — PATIENT COMMUNICATION
Pt is requesting a dispense quantity of 10 tablets Ritalin.    Received request via: Patient    Was the patient seen in the last year in this department? Yes    Does the patient have an active prescription (recently filled or refills available) for medication(s) requested? No

## 2022-11-01 ENCOUNTER — TELEMEDICINE (OUTPATIENT)
Dept: BEHAVIORAL HEALTH | Facility: CLINIC | Age: 67
End: 2022-11-01
Payer: MEDICARE

## 2022-11-01 DIAGNOSIS — F90.0 ADHD, PREDOMINANTLY INATTENTIVE TYPE: ICD-10-CM

## 2022-11-01 DIAGNOSIS — F33.41 MDD (MAJOR DEPRESSIVE DISORDER), RECURRENT, IN PARTIAL REMISSION (HCC): ICD-10-CM

## 2022-11-01 DIAGNOSIS — F33.8 SEASONAL AFFECTIVE DISORDER (HCC): ICD-10-CM

## 2022-11-01 PROBLEM — G43.909 MIGRAINES: Status: ACTIVE | Noted: 2022-11-01

## 2022-11-01 PROCEDURE — 99214 OFFICE O/P EST MOD 30 MIN: CPT | Mod: 95 | Performed by: PSYCHIATRY & NEUROLOGY

## 2022-11-01 PROCEDURE — 90833 PSYTX W PT W E/M 30 MIN: CPT | Mod: 95 | Performed by: PSYCHIATRY & NEUROLOGY

## 2022-11-01 RX ORDER — VENLAFAXINE HYDROCHLORIDE 75 MG/1
75 CAPSULE, EXTENDED RELEASE ORAL DAILY
Qty: 90 CAPSULE | Refills: 0 | Status: SHIPPED | OUTPATIENT
Start: 2022-11-01 | End: 2023-01-27

## 2022-11-01 RX ORDER — METHYLPHENIDATE HYDROCHLORIDE 20 MG/1
20 TABLET ORAL 3 TIMES DAILY
Qty: 90 TABLET | Refills: 0 | Status: SHIPPED | OUTPATIENT
Start: 2022-12-29 | End: 2023-01-28

## 2022-11-01 RX ORDER — METHYLPHENIDATE HYDROCHLORIDE 20 MG/1
20 TABLET ORAL 3 TIMES DAILY
Qty: 90 TABLET | Refills: 0 | Status: SHIPPED | OUTPATIENT
Start: 2022-11-01 | End: 2022-12-01

## 2022-11-01 RX ORDER — METHYLPHENIDATE HYDROCHLORIDE 20 MG/1
20 TABLET ORAL 3 TIMES DAILY
Qty: 90 TABLET | Refills: 0 | Status: SHIPPED | OUTPATIENT
Start: 2022-11-30 | End: 2022-12-30

## 2022-11-01 NOTE — PROGRESS NOTES
PSYCHIATRY VIRTUAL VISIT FOLLOW-UP NOTE      Chief Complaint   Patient presents with    Follow-Up     ADHD, depression     This evaluation was conducted via Zoom using secure and encrypted videoconferencing technology.   The patient was in their home in the [facility].    The patient's identity was confirmed and verbal consent was obtained for this virtual visit.    History Of Present Illness:  Chris Garvin is a 67 y.o. male with ADHD, major depressive disorder, Layton's esophagus, IBS, GERD, asthma, sleep apnea on CPAP, migraines comes in today for follow up, was last seen 3 months ago.  He has noticed some struggles with depression recently.  He does have seasonal depression and is concerned about his depression with the upcoming time change.  He has not been using his light box.  He also is worried about his wife who continues to have health issues.  He is her primary caretaker and has not been doing much outside of that.  He has been eating healthy and that has helped his IBS symptoms.  He is compliant with both Ritalin and Effexor.  He is doing all right in regards to his focus and concentration.  He endorses passive thoughts at that at times when he is feeling depressed but denies dwelling on those thoughts or having intrusive thoughts, denies having thoughts of wanting to hurt himself.    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      Coenzyme Q10 (CO Q 10 PO) Take  by mouth.      methylphenidate (RITALIN) 20 MG tablet Take 1 Tablet by mouth 3 times a day for 4 days. 10 Tablet 0    venlafaxine XR (EFFEXOR XR) 75 MG CAPSULE SR 24 HR Take 1 Capsule by mouth every day. 90 Capsule 0    Loratadine 10 MG Cap Take  by mouth.      tamsulosin (FLOMAX) 0.4 MG capsule 1 CAP(S) ORALLY ONCE A DAY 90 DAY(S)      multivitamin (THERAGRAN) Tab Take 1 Tab by mouth every day.      dicyclomine (BENTYL) 10 MG Cap Take 10 mg by mouth every 6 hours as needed.  2    PROAIR  (90 Base) MCG/ACT Aero Soln inhalation aerosol Inhale 2 Puffs by mouth 4 times a day.  11    pantoprazole (PROTONIX) 40 MG Tablet Delayed Response Take 40 mg by mouth every morning.  5     No current facility-administered medications for this visit.     Review Of Systems:    Neurological - Positive for headaches  Psychiatric - Positive for depression    Physical Examination:  Vital signs: There were no vitals taken for this visit.    Musculoskeletal: No abnormal movements.     Mental Status Evaluation:   General: Middle aged white male, dressed in casual attire, good grooming and hygiene, in no apparent distress, calm and cooperative, good eye contact, no psychomotor agitation or retardation  Orientation: Alert and oriented to person, place and time  Recent and remote memory: Grossly intact  Attention span and concentration: Grossly intact  Speech: Spontaneous, normal rate, rhythm and tone  Thought Process: Linear, logical and goal directed  Thought Content: Denies active suicidal or homicidal ideations, intent or plan  Perception: No delusions noted  Associations: Intact  Language: Appropriate  Fund of knowledge and vocabulary: Grossly adequate  Mood: \"good\"  Affect: Euthymic, mood congruent  Insight: Good  Judgment: Good    Depression screening:  Depression Screen (PHQ-2/PHQ-9) 12/4/2019 2/23/2021 2/7/2022   PHQ-2 Total Score - - -   PHQ-2 Total Score - 4 -   PHQ-2 Total Score 1 " - 1   PHQ-9 Total Score - - -   PHQ-9 Total Score - 6 -   PHQ-9 Total Score - - 7     Interpretation of PHQ-9 Total Score   Score Severity   1-4 No Depression   5-9 Mild Depression   10-14 Moderate Depression   15-19 Moderately Severe Depression   20-27 Severe Depression    Medical Records/Labs/Diagnostic Tests Reviewed:  [place] PDMP records - appropriate refills, no abuse suspected       Impression:  1.  ADHD, inattentive type - stable  2.  Major depressive disorder, recurrent, in partial remission (with seasonal component, worse in baz[place]) - slight worsening   3.  Seasonal affective disorder - slight worsening    Plan:  1.  Continue Effexor XR 75 mg in the morning for depression.  2.  Continue Ritalin 20 mg 3 times daily for ADHD.  E-prescribed x 90 days  3.  I have advised him to start using his light box, 10,000 lux for 30 minutes every morning through the winter months  4.  Provided supportive psychotherapy (> 16 minutes): Advised him to take better care of himself, caregiver fatigue, encouraged him to go to the gym 30 minutes 3 times a week which will help with his physical and mental health, discussed how in being his wife's caretaker has changed roles in their marriage etc.    Return to clinic in 3 months or sooner if symptoms worsen    The proposed treatment plan was discussed with the patient who was provided the opportunity to ask questions and make suggestions regarding alternative treatment. Patient verbalized understanding and expressed agreement with the plan.     Karo Romano M.D.  11/01/22    This note was created using voice recognition software (Dragon). The accuracy of the dictation is limited by the abilities of the software. I have reviewed the note prior to signing, however some errors in grammar and context are still possible. If you have any questions related to this note please do not hesitate to contact our office.

## 2022-11-08 ENCOUNTER — PATIENT MESSAGE (OUTPATIENT)
Dept: HEALTH INFORMATION MANAGEMENT | Facility: OTHER | Age: 67
End: 2022-11-08

## 2022-12-23 ENCOUNTER — PATIENT MESSAGE (OUTPATIENT)
Dept: BEHAVIORAL HEALTH | Facility: CLINIC | Age: 67
End: 2022-12-23
Payer: MEDICARE

## 2022-12-23 DIAGNOSIS — F90.0 ADHD, PREDOMINANTLY INATTENTIVE TYPE: ICD-10-CM

## 2022-12-23 RX ORDER — METHYLPHENIDATE HYDROCHLORIDE 20 MG/1
20 TABLET ORAL 3 TIMES DAILY
Qty: 90 TABLET | Refills: 0 | Status: CANCELLED
Start: 2022-12-23 | End: 2023-01-22

## 2022-12-23 RX ORDER — METHYLPHENIDATE HYDROCHLORIDE 20 MG/1
20 TABLET ORAL 3 TIMES DAILY
Qty: 12 TABLET | Refills: 0 | Status: SHIPPED | OUTPATIENT
Start: 2022-12-26 | End: 2022-12-30

## 2022-12-23 NOTE — PATIENT COMMUNICATION
Received request via: Patient    Was the patient seen in the last year in this department? Yes    Does the patient have an active prescription (recently filled or refills available) for medication(s) requested? No    Does the patient have alf Plus and need 100 day supply (blood pressure, diabetes and cholesterol meds only)? Medication is not for cholesterol, blood pressure or diabetes and Patient does not have SCP

## 2023-01-27 ENCOUNTER — TELEMEDICINE (OUTPATIENT)
Dept: BEHAVIORAL HEALTH | Facility: CLINIC | Age: 68
End: 2023-01-27
Payer: MEDICARE

## 2023-01-27 DIAGNOSIS — F90.0 ADHD, PREDOMINANTLY INATTENTIVE TYPE: ICD-10-CM

## 2023-01-27 DIAGNOSIS — F33.8 SEASONAL AFFECTIVE DISORDER (HCC): ICD-10-CM

## 2023-01-27 DIAGNOSIS — F33.0 MDD (MAJOR DEPRESSIVE DISORDER), RECURRENT EPISODE, MILD (HCC): ICD-10-CM

## 2023-01-27 PROCEDURE — 90833 PSYTX W PT W E/M 30 MIN: CPT | Mod: 95 | Performed by: PSYCHIATRY & NEUROLOGY

## 2023-01-27 PROCEDURE — 99214 OFFICE O/P EST MOD 30 MIN: CPT | Mod: 95 | Performed by: PSYCHIATRY & NEUROLOGY

## 2023-01-27 RX ORDER — METHYLPHENIDATE HYDROCHLORIDE 20 MG/1
20 TABLET ORAL 2 TIMES DAILY
Qty: 60 TABLET | Refills: 0 | Status: CANCELLED | OUTPATIENT
Start: 2023-03-26 | End: 2023-04-25

## 2023-01-27 RX ORDER — METHYLPHENIDATE HYDROCHLORIDE 20 MG/1
20 TABLET ORAL 3 TIMES DAILY
Qty: 90 TABLET | Refills: 0 | Status: SHIPPED | OUTPATIENT
Start: 2023-02-25 | End: 2023-03-27

## 2023-01-27 RX ORDER — INDOMETHACIN 25 MG/1
CAPSULE ORAL
COMMUNITY
Start: 2023-01-17 | End: 2023-03-10

## 2023-01-27 RX ORDER — VENLAFAXINE HYDROCHLORIDE 150 MG/1
150 CAPSULE, EXTENDED RELEASE ORAL DAILY
Qty: 90 CAPSULE | Refills: 0 | Status: SHIPPED | OUTPATIENT
Start: 2023-01-27 | End: 2023-03-10 | Stop reason: SDUPTHER

## 2023-01-27 RX ORDER — METHYLPHENIDATE HYDROCHLORIDE 20 MG/1
20 TABLET ORAL 3 TIMES DAILY
Qty: 90 TABLET | Refills: 0 | Status: SHIPPED | OUTPATIENT
Start: 2023-01-27 | End: 2023-02-26

## 2023-01-27 ASSESSMENT — PATIENT HEALTH QUESTIONNAIRE - PHQ9
SUM OF ALL RESPONSES TO PHQ QUESTIONS 1-9: 10
CLINICAL INTERPRETATION OF PHQ2 SCORE: 2
5. POOR APPETITE OR OVEREATING: 0 - NOT AT ALL

## 2023-01-27 NOTE — PROGRESS NOTES
PSYCHIATRY VIRTUAL VISIT FOLLOW-UP NOTE    Chief Complaint   Patient presents with    Follow-Up     Depression, ADHD     This evaluation was conducted via Zoom using secure and encrypted videoconferencing technology.   The patient was in their home in the [facility].    The patient's identity was confirmed and verbal consent was obtained for this virtual visit.    History Of Present Illness:  Chris Garvin is a 67 y.o. male with ADHD, major depressive disorder, Layton's esophagus, IBS, GERD, asthma, sleep apnea on CPAP, migraines comes in today for follow up, was last seen 3 months ago.  He has been struggling with worsening depression in the last few months.  He mentions that his wife's health is declining and she needs another back surgery which has been definitely adding to his depression.  He has been doing a lot more around the house and that has been a slight increase in conflict between them as well.  He tries to go out and skiing which he really enjoys but then feels guilty about having fun without his wife.  He has been going to [employer] as usual.  He has been compliant with his medications.  He continues to have passive thoughts of death but denies dwelling on those thoughts or having active thoughts, intent or plan of wanting to hurt himself.    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 90 Tablet 0     "Aspirin-Acetaminophen-Caffeine (EXCEDRIN MIGRAINE PO) Take  by mouth.      Coenzyme Q10 (CO Q 10 PO) Take  by mouth.      venlafaxine XR (EFFEXOR XR) 75 MG CAPSULE SR 24 HR Take 1 Capsule by mouth every day. 90 Capsule 0    Loratadine 10 MG Cap Take  by mouth.      tamsulosin (FLOMAX) 0.4 MG capsule 1 CAP(S) ORALLY ONCE A DAY 90 DAY(S)      multivitamin (THERAGRAN) Tab Take 1 Tab by mouth every day.      dicyclomine (BENTYL) 10 MG Cap Take 10 mg by mouth every 6 hours as needed.  2    PROAIR  (90 Base) MCG/ACT Aero Soln inhalation aerosol Inhale 2 Puffs by mouth 4 times a day.  11    pantoprazole (PROTONIX) 40 MG Tablet Delayed Response Take 40 mg by mouth every morning.  5     No current facility-administered medications for this visit.     Review Of Systems:    Neurological - Positive for headaches  Psychiatric - Positive for depression    Physical Examination:  Vital signs: There were no vitals taken for this visit.    Musculoskeletal: No abnormal movements.     Mental Status Evaluation:   General: Middle aged white male, dressed in casual attire, good grooming and hygiene, in no apparent distress, calm and cooperative, good eye contact, no psychomotor agitation or retardation  Orientation: Alert and oriented to person, place and time  Recent and remote memory: Grossly intact  Attention span and concentration: Grossly intact  Speech: Spontaneous, normal rate, rhythm and tone  Thought Process: Linear, logical and goal directed  Thought Content: Denies active suicidal or homicidal ideations, intent or plan  Perception: No delusions noted  Associations: Intact  Language: Appropriate  Fund of knowledge and vocabulary: Grossly adequate  Mood: \"not the best\"  Affect: Dysphoric at times, mood congruent  Insight: Good  Judgment: Good    Depression screenin2021   Depression Screen (PHQ-2/PHQ-9)   PHQ-2 Total Score 4     PHQ-2 Total Score  1 2   PHQ-9 Total Score 6     PHQ-9 Total " Score  7 10       Multiple values from one day are sorted in reverse-chronological order     Interpretation of PHQ-9 Total Score   Score Severity   1-4 No Depression   5-9 Mild Depression   10-14 Moderate Depression   15-19 Moderately Severe Depression   20-27 Severe Depression    Medical Records/Labs/Diagnostic Tests Reviewed:  [place] PDMP records - appropriate refills, no abuse suspected       Impression:  1.  ADHD, inattentive type - stable  2.  Major depressive disorder, recurrent, mild (with seasonal component, worse in b[place]) - worsening   3.  Seasonal affective disorder - stable    Plan:  1.  Increase Effexor XR to 150 mg in the morning for depression.  2.  Continue Ritalin 20 mg 3 times daily for ADHD.  E-prescribed x 2 months  3.  Continue bright light therapy for seasonal depression  4.  Provided supportive psychotherapy (> 16 minutes): Emotional validation in regards to worsening depression, worries related to wife's declining health, encouraged him to do things for himself and how not to feel guilty about having some fun in his life, focusing on present rather than future when it comes to his marriage.    Return to clinic in 6 weeks or sooner if symptoms worsen    The proposed treatment plan was discussed with the patient who was provided the opportunity to ask questions and make suggestions regarding alternative treatment. Patient verbalized understanding and expressed agreement with the plan.     Karo Romano M.D.  01/27/23    This note was created using voice recognition software (Dragon). The accuracy of the dictation is limited by the abilities of the software. I have reviewed the note prior to signing, however some errors in grammar and context are still possible. If you have any questions related to this note please do not hesitate to contact our office.

## 2023-01-31 ENCOUNTER — PATIENT MESSAGE (OUTPATIENT)
Dept: BEHAVIORAL HEALTH | Facility: CLINIC | Age: 68
End: 2023-01-31
Payer: MEDICARE

## 2023-01-31 DIAGNOSIS — F90.0 ADHD, PREDOMINANTLY INATTENTIVE TYPE: ICD-10-CM

## 2023-01-31 RX ORDER — METHYLPHENIDATE HYDROCHLORIDE 20 MG/1
20 TABLET ORAL 3 TIMES DAILY
Qty: 90 TABLET | Refills: 0 | Status: SHIPPED | OUTPATIENT
Start: 2023-01-31 | End: 2023-03-02

## 2023-01-31 RX ORDER — METHYLPHENIDATE HYDROCHLORIDE 20 MG/1
20 TABLET ORAL 3 TIMES DAILY
Qty: 90 TABLET | Refills: 0 | Status: CANCELLED
Start: 2023-01-31 | End: 2023-03-02

## 2023-02-01 NOTE — PATIENT COMMUNICATION
Received request via: Patient    Was the patient seen in the last year in this department? Yes    Does the patient have an active prescription (recently filled or refills available) for medication(s) requested? No    Does the patient have jail Plus and need 100 day supply (blood pressure, diabetes and cholesterol meds only)? Medication is not for cholesterol, blood pressure or diabetes and Patient does not have SCP

## 2023-03-10 ENCOUNTER — TELEMEDICINE (OUTPATIENT)
Dept: BEHAVIORAL HEALTH | Facility: CLINIC | Age: 68
End: 2023-03-10
Payer: MEDICARE

## 2023-03-10 DIAGNOSIS — F33.0 MDD (MAJOR DEPRESSIVE DISORDER), RECURRENT EPISODE, MILD (HCC): ICD-10-CM

## 2023-03-10 DIAGNOSIS — F90.0 ADHD, PREDOMINANTLY INATTENTIVE TYPE: ICD-10-CM

## 2023-03-10 PROCEDURE — 99214 OFFICE O/P EST MOD 30 MIN: CPT | Mod: 95 | Performed by: PSYCHIATRY & NEUROLOGY

## 2023-03-10 PROCEDURE — 90833 PSYTX W PT W E/M 30 MIN: CPT | Mod: 95 | Performed by: PSYCHIATRY & NEUROLOGY

## 2023-03-10 RX ORDER — VENLAFAXINE HYDROCHLORIDE 150 MG/1
150 CAPSULE, EXTENDED RELEASE ORAL DAILY
Qty: 90 CAPSULE | Refills: 0 | Status: SHIPPED | OUTPATIENT
Start: 2023-03-10 | End: 2023-06-22 | Stop reason: SDUPTHER

## 2023-03-10 RX ORDER — METHYLPHENIDATE HYDROCHLORIDE 20 MG/1
20 TABLET ORAL 3 TIMES DAILY
Qty: 90 TABLET | Refills: 0 | Status: SHIPPED | OUTPATIENT
Start: 2023-03-31 | End: 2023-04-30

## 2023-03-10 RX ORDER — METHYLPHENIDATE HYDROCHLORIDE 20 MG/1
20 TABLET ORAL 3 TIMES DAILY
Qty: 90 TABLET | Refills: 0 | Status: SHIPPED | OUTPATIENT
Start: 2023-05-28 | End: 2023-06-27

## 2023-03-10 RX ORDER — METHYLPHENIDATE HYDROCHLORIDE 20 MG/1
20 TABLET ORAL 3 TIMES DAILY
Qty: 90 TABLET | Refills: 0 | Status: SHIPPED | OUTPATIENT
Start: 2023-04-29 | End: 2023-05-29

## 2023-03-10 NOTE — PROGRESS NOTES
PSYCHIATRY VIRTUAL VISIT FOLLOW-UP NOTE    Chief Complaint   Patient presents with    Follow-Up     Depression, ADHD     This evaluation was conducted via Zoom using secure and encrypted videoconferencing technology.   The patient was in their home in the [facility].    The patient's identity was confirmed and verbal consent was obtained for this virtual visit.    History Of Present Illness:  Chris Garvin is a 67 y.o. male with ADHD, major depressive disorder, Layton's esophagus, IBS, GERD, asthma, sleep apnea on CPAP, migraines comes in today for follow up, was last seen 6 weeks ago.  He is doing better in regards to his depression with the higher dose of Effexor.  He has noticed that he is less depressed and more motivated to do things.  His wife has also noticed improvements in his mood.  He denies any side effects of her from the higher dose of Effexor.  He continues to worry about his wife who has health issues and her back surgery recently got postponed.  He is trying to do things for himself but feels guilty when he is having fun.  He continues to ski when possible.  He stopped using his bright light therapy recently and is doing well without it.  He continues to stay active in [employer].  He denies having thoughts of wanting to hurt himself.    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 90 Tablet 0     "indomethacin (INDOCIN) 25 MG Cap 1 CAPSULE WITH FOOD OR MILK ORALLY THREE TIMES A DAY 10 DAY(S)      venlafaxine (EFFEXOR-XR) 150 MG extended-release capsule Take 1 Capsule by mouth every day. 90 Capsule 0    Aspirin-Acetaminophen-Caffeine (EXCEDRIN MIGRAINE PO) Take  by mouth.      Coenzyme Q10 (CO Q 10 PO) Take  by mouth.      Loratadine 10 MG Cap Take  by mouth.      tamsulosin (FLOMAX) 0.4 MG capsule 1 CAP(S) ORALLY ONCE A DAY 90 DAY(S)      multivitamin (THERAGRAN) Tab Take 1 Tab by mouth every day.      dicyclomine (BENTYL) 10 MG Cap Take 10 mg by mouth every 6 hours as needed.  2    PROAIR  (90 Base) MCG/ACT Aero Soln inhalation aerosol Inhale 2 Puffs by mouth 4 times a day.  11    pantoprazole (PROTONIX) 40 MG Tablet Delayed Response Take 40 mg by mouth every morning.  5     No current facility-administered medications for this visit.     Review Of Systems:    Neurological - Positive for headaches  Psychiatric - Positive for depression    Physical Examination:  Vital signs: There were no vitals taken for this visit.    Musculoskeletal: No abnormal movements.     Mental Status Evaluation:   General: Middle aged white male, dressed in casual attire, good grooming and hygiene, in no apparent distress, calm and cooperative, good eye contact, no psychomotor agitation or retardation  Orientation: Alert and oriented to person, place and time  Recent and remote memory: Grossly intact  Attention span and concentration: Grossly intact  Speech: Spontaneous, normal rate, rhythm and tone  Thought Process: Linear, logical and goal directed  Thought Content: Denies active suicidal or homicidal ideations, intent or plan  Perception: No delusions noted  Associations: Intact  Language: Appropriate  Fund of knowledge and vocabulary: Grossly adequate  Mood: \"I'm feeling better\"  Affect: Euthymic, mood congruent  Insight: Good  Judgment: Good    Depression screenin/23/2021    11:25 AM 2022     3:00 PM " 1/27/2023     1:30 PM   Depression Screen (PHQ-2/PHQ-9)   PHQ-2 Total Score 4     PHQ-2 Total Score  1 2   PHQ-9 Total Score 6     PHQ-9 Total Score  7 10     Interpretation of PHQ-9 Total Score   Score Severity   1-4 No Depression   5-9 Mild Depression   10-14 Moderate Depression   15-19 Moderately Severe Depression   20-27 Severe Depression    Medical Records/Labs/Diagnostic Tests Reviewed:  [place] PDMP records - appropriate refills, no abuse suspected       Impression:  1.  ADHD, inattentive type - stable  2.  Major depressive disorder, recurrent, mild (with seasonal component, worse in b[place]) - improving      Plan:  1.  Continue Effexor  mg in the morning for depression.  2.  Continue Ritalin 20 mg 3 times daily for ADHD.  E-prescribed x 3 months  3.  I advised him to get a blood pressure cuff and start monitoring his blood pressure 2-3 times a week  4.  Provided supportive psychotherapy (> 16 minutes): Feelings related to wife's declining mental health, encouraged him to take care of himself so that he does not struggle with caregiver fatigue    Return to clinic in 3 months or sooner if symptoms worsen    The proposed treatment plan was discussed with the patient who was provided the opportunity to ask questions and make suggestions regarding alternative treatment. Patient verbalized understanding and expressed agreement with the plan.     Karo Romano M.D.  03/10/23    This note was created using voice recognition software (Dragon). The accuracy of the dictation is limited by the abilities of the software. I have reviewed the note prior to signing, however some errors in grammar and context are still possible. If you have any questions related to this note please do not hesitate to contact our office.

## 2023-04-10 ENCOUNTER — PATIENT MESSAGE (OUTPATIENT)
Dept: BEHAVIORAL HEALTH | Facility: CLINIC | Age: 68
End: 2023-04-10
Payer: MEDICARE

## 2023-04-10 DIAGNOSIS — F90.0 ADHD, PREDOMINANTLY INATTENTIVE TYPE: ICD-10-CM

## 2023-04-11 RX ORDER — METHYLPHENIDATE HYDROCHLORIDE 20 MG/1
20 TABLET ORAL 3 TIMES DAILY
Qty: 51 TABLET | Refills: 0 | Status: SHIPPED | OUTPATIENT
Start: 2023-04-12 | End: 2023-04-29

## 2023-04-11 RX ORDER — METHYLPHENIDATE HYDROCHLORIDE 20 MG/1
20 TABLET ORAL 3 TIMES DAILY
Qty: 90 TABLET | Refills: 0 | Status: CANCELLED | OUTPATIENT
Start: 2023-05-28 | End: 2023-06-27

## 2023-04-11 NOTE — PATIENT COMMUNICATION
Pt is requesting a new script for the remainder 51 tablets of Ritalin.    Received request via: Patient    Was the patient seen in the last year in this department? Yes    Does the patient have an active prescription (recently filled or refills available) for medication(s) requested? No    Does the patient have retirement Plus and need 100 day supply (blood pressure, diabetes and cholesterol meds only)? Medication is not for cholesterol, blood pressure or diabetes and Patient does not have SCP

## 2023-06-22 ENCOUNTER — TELEMEDICINE (OUTPATIENT)
Dept: BEHAVIORAL HEALTH | Facility: CLINIC | Age: 68
End: 2023-06-22
Payer: MEDICARE

## 2023-06-22 DIAGNOSIS — F90.0 ADHD, PREDOMINANTLY INATTENTIVE TYPE: ICD-10-CM

## 2023-06-22 DIAGNOSIS — F33.41 MDD (MAJOR DEPRESSIVE DISORDER), RECURRENT, IN PARTIAL REMISSION (HCC): ICD-10-CM

## 2023-06-22 PROCEDURE — 99214 OFFICE O/P EST MOD 30 MIN: CPT | Mod: 95 | Performed by: PSYCHIATRY & NEUROLOGY

## 2023-06-22 RX ORDER — VENLAFAXINE HYDROCHLORIDE 150 MG/1
150 CAPSULE, EXTENDED RELEASE ORAL DAILY
Qty: 90 CAPSULE | Refills: 0 | Status: SHIPPED | OUTPATIENT
Start: 2023-06-22 | End: 2023-09-22 | Stop reason: SDUPTHER

## 2023-06-22 RX ORDER — METHYLPHENIDATE HYDROCHLORIDE 20 MG/1
20 TABLET ORAL 3 TIMES DAILY
Qty: 90 TABLET | Refills: 0 | Status: SHIPPED | OUTPATIENT
Start: 2023-06-26 | End: 2023-07-26

## 2023-06-22 RX ORDER — METHYLPHENIDATE HYDROCHLORIDE 20 MG/1
20 TABLET ORAL 3 TIMES DAILY
Qty: 90 TABLET | Refills: 0 | Status: SHIPPED | OUTPATIENT
Start: 2023-08-23 | End: 2023-09-22

## 2023-06-22 RX ORDER — METHYLPHENIDATE HYDROCHLORIDE 20 MG/1
20 TABLET ORAL 3 TIMES DAILY
Qty: 90 TABLET | Refills: 0 | Status: SHIPPED | OUTPATIENT
Start: 2023-07-25 | End: 2023-08-24

## 2023-06-22 NOTE — PROGRESS NOTES
PSYCHIATRY VIRTUAL VISIT FOLLOW-UP NOTE    Chief Complaint   Patient presents with    Follow-Up     Depression, ADHD     This evaluation was conducted via Zoom using secure and encrypted videoconferencing technology.   The patient was in their home in the [facility].    The patient's identity was confirmed and verbal consent was obtained for this virtual visit.    History Of Present Illness:  Chris Garvin is a 68 y.o. male with ADHD, major depressive disorder, Layton's esophagus, IBS, GERD, asthma, sleep apnea on CPAP, migraines comes in today for follow up, was last seen over 3 months ago.  He has been doing well in regards to his mental health.  His wife has been in and out of the hospital which has been stressful but he has been able to manage stress better than before.  He continues to be worried about her and is trying to take care of her as much as possible.  He continues to be compliant with both Effexor and Ritalin and is endorsing benefit.  He recently celebrated 22 years of sobriety and is feeling good about it.  He denies having some wanting to hurt himself.    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 90 Tablet 0    venlafaxine (EFFEXOR-XR) 150 MG extended-release capsule Take 1 Capsule by mouth every day. 90  "Capsule 0    Aspirin-Acetaminophen-Caffeine (EXCEDRIN MIGRAINE PO) Take  by mouth.      Coenzyme Q10 (CO Q 10 PO) Take  by mouth.      Loratadine 10 MG Cap Take  by mouth.      multivitamin (THERAGRAN) Tab Take 1 Tab by mouth every day.      dicyclomine (BENTYL) 10 MG Cap Take 10 mg by mouth every 6 hours as needed.  2    PROAIR  (90 Base) MCG/ACT Aero Soln inhalation aerosol Inhale 2 Puffs by mouth 4 times a day.  11    pantoprazole (PROTONIX) 40 MG Tablet Delayed Response Take 40 mg by mouth every morning.  5     No current facility-administered medications for this visit.     Review Of Systems:    Psychiatric - Positive for depression    Physical Examination:  Vital signs: There were no vitals taken for this visit.    Musculoskeletal: No abnormal movements.     Mental Status Evaluation:   General: Middle aged male, dressed in casual attire, good grooming and hygiene, in no apparent distress, calm and cooperative, good eye contact, no psychomotor agitation or retardation  Orientation: Alert and oriented to person, place and time  Recent and remote memory: Grossly intact  Attention span and concentration: Grossly intact  Speech: Spontaneous, normal rate, rhythm and tone  Thought Process: Linear, logical and goal directed  Thought Content: Denies active suicidal or homicidal ideations, intent or plan  Perception: No delusions noted  Associations: Intact  Language: Appropriate  Fund of knowledge and vocabulary: Grossly adequate  Mood: alright\"  Affect: Euthymic, mood congruent  Insight: Good  Judgment: Good    Depression screenin/23/2021    11:25 AM 2022     3:00 PM 2023     1:30 PM   Depression Screen (PHQ-2/PHQ-9)   PHQ-2 Total Score 4     PHQ-2 Total Score  1 2   PHQ-9 Total Score 6     PHQ-9 Total Score  7 10     Interpretation of PHQ-9 Total Score   Score Severity   1-4 No Depression   5-9 Mild Depression   10-14 Moderate Depression   15-19 Moderately Severe Depression   20-27 Severe " Depression    Medical Records/Labs/Diagnostic Tests Reviewed:  [place] PDMP records - appropriate refills, no abuse suspected       Impression:  1.  ADHD, inattentive type - stable  2.  Major depressive disorder, recurrent, in partial remission (with seasonal component, worse in [place]) - stable      Plan:  1.  Continue Effexor  mg in the morning for depression.  2.  Continue Ritalin 20 mg 3 times daily for ADHD.  E-prescribed x 3 months    Return to clinic in 3 months or sooner if symptoms worsen    The proposed treatment plan was discussed with the patient who was provided the opportunity to ask questions and make suggestions regarding alternative treatment. Patient verbalized understanding and expressed agreement with the plan.     Karo Romano M.D.  06/22/23    This note was created using voice recognition software (Dragon). The accuracy of the dictation is limited by the abilities of the software. I have reviewed the note prior to signing, however some errors in grammar and context are still possible. If you have any questions related to this note please do not hesitate to contact our office.

## 2023-09-22 ENCOUNTER — TELEMEDICINE (OUTPATIENT)
Dept: BEHAVIORAL HEALTH | Facility: CLINIC | Age: 68
End: 2023-09-22
Payer: MEDICARE

## 2023-09-22 VITALS — HEIGHT: 70 IN | WEIGHT: 248 LBS | BODY MASS INDEX: 35.5 KG/M2

## 2023-09-22 DIAGNOSIS — F90.0 ADHD, PREDOMINANTLY INATTENTIVE TYPE: ICD-10-CM

## 2023-09-22 DIAGNOSIS — F33.0 MDD (MAJOR DEPRESSIVE DISORDER), RECURRENT EPISODE, MILD (HCC): ICD-10-CM

## 2023-09-22 PROCEDURE — 90833 PSYTX W PT W E/M 30 MIN: CPT | Mod: 95 | Performed by: PSYCHIATRY & NEUROLOGY

## 2023-09-22 PROCEDURE — 99214 OFFICE O/P EST MOD 30 MIN: CPT | Mod: 95 | Performed by: PSYCHIATRY & NEUROLOGY

## 2023-09-22 RX ORDER — VENLAFAXINE HYDROCHLORIDE 75 MG/1
75 CAPSULE, EXTENDED RELEASE ORAL DAILY
Qty: 90 CAPSULE | Refills: 0 | Status: SHIPPED | OUTPATIENT
Start: 2023-09-22 | End: 2023-11-17

## 2023-09-22 RX ORDER — DULOXETIN HYDROCHLORIDE 60 MG/1
60 CAPSULE, DELAYED RELEASE ORAL DAILY
Qty: 30 CAPSULE | Refills: 1 | Status: CANCELLED | OUTPATIENT
Start: 2023-09-22

## 2023-09-22 RX ORDER — VENLAFAXINE HYDROCHLORIDE 150 MG/1
150 CAPSULE, EXTENDED RELEASE ORAL DAILY
Qty: 90 CAPSULE | Refills: 0 | Status: SHIPPED | OUTPATIENT
Start: 2023-09-22 | End: 2023-11-17

## 2023-09-22 RX ORDER — METHYLPHENIDATE HYDROCHLORIDE 20 MG/1
20 TABLET ORAL 3 TIMES DAILY
Qty: 90 TABLET | Refills: 0 | Status: SHIPPED | OUTPATIENT
Start: 2023-09-22 | End: 2023-10-22

## 2023-09-22 RX ORDER — METHYLPHENIDATE HYDROCHLORIDE 20 MG/1
20 TABLET ORAL 3 TIMES DAILY
Qty: 90 TABLET | Refills: 0 | Status: SHIPPED | OUTPATIENT
Start: 2023-10-21 | End: 2023-11-20

## 2023-09-22 ASSESSMENT — FIBROSIS 4 INDEX: FIB4 SCORE: 1.57

## 2023-09-22 NOTE — PROGRESS NOTES
PSYCHIATRY VIRTUAL VISIT FOLLOW-UP NOTE    Chief Complaint   Patient presents with    Depression     Worsening, discuss different anti depressant medications      This evaluation was conducted via Zoom using secure and encrypted videoconferencing technology.   The patient was in their home in the [facility].    The patient's identity was confirmed and verbal consent was obtained for this virtual visit.    History Of Present Illness:  Chris Garvin is a 68 y.o. male with ADHD, major depressive disorder, Layton's esophagus, IBS, GERD, asthma, sleep apnea on CPAP, migraines comes in today for follow up, was last seen over 3 months ago.  He has been struggling with worsening depression for the last few months.  He has been feeling unhappy and unmotivated to do things.  He has not been going to the gym like before.  He has gained weight and he is not happy about it.  He mentioned that his wife is doing better in regards to her health but he is still not feeling any better.  He is compliant with his Ritalin which does give him focus as well as energy.  He has had some passive thoughts of death but denies dwelling on those thoughts or having active thoughts, intent or plan of wanting to hurt himself.    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     Substance Use:  Alcohol - Denies, sober for 22+ years  Nicotine - Denies  Cannabis - Denies   Illicit drugs - Denies    Previous medication trials:  Wellbutrin (effective), Concerta 72 mg (effective, s/e - abdominal discomfort), Adderall XR 40 mg (s/e - abdominal discomfort)    Medications:  Current Outpatient Medications   Medication Sig Dispense Refill    [START ON 10/21/2023] methylphenidate (RITALIN) 20 MG tablet Take 1 Tablet by mouth 3 times a day for 30 days. 90 Tablet 0    methylphenidate (RITALIN) 20 MG tablet  "Take 1 Tablet by mouth 3 times a day for 30 days. 90 Tablet 0    venlafaxine XR (EFFEXOR XR) 75 MG CAPSULE SR 24 HR Take 1 Capsule by mouth every day. Take in addition to Effexor  mg for total daily dose of 225 mg. 90 Capsule 0    venlafaxine (EFFEXOR-XR) 150 MG extended-release capsule Take 1 Capsule by mouth every day. Take in addition to Effexor XR 75 mg for total daily dose of 225 mg. 90 Capsule 0    methylphenidate (RITALIN) 20 MG tablet Take 1 Tablet by mouth 3 times a day for 30 days. 90 Tablet 0    tamsulosin (FLOMAX) 0.4 MG capsule 1 CAP(S) ORALLY ONCE A DAY 90 DAY(S) 90 Capsule 3    Aspirin-Acetaminophen-Caffeine (EXCEDRIN MIGRAINE PO) Take  by mouth.      Coenzyme Q10 (CO Q 10 PO) Take  by mouth.      multivitamin (THERAGRAN) Tab Take 1 Tab by mouth every day.      dicyclomine (BENTYL) 10 MG Cap Take 10 mg by mouth every 6 hours as needed.  2    pantoprazole (PROTONIX) 40 MG Tablet Delayed Response Take 40 mg by mouth every morning.  5    Loratadine 10 MG Cap Take  by mouth.       No current facility-administered medications for this visit.     Review Of Systems:    Psychiatric - Positive for depression    Physical Examination:  Vital signs: Ht 1.778 m (5' 10\")   Wt 112 kg (248 lb) Comment: patient reported  BMI 35.58 kg/m²     Musculoskeletal: No abnormal movements.     Mental Status Evaluation:   General: Middle aged male, dressed in casual attire, good grooming and hygiene, in no apparent distress, calm and cooperative, good eye contact, no psychomotor agitation or retardation  Orientation: Alert and oriented to person, place and time  Recent and remote memory: Grossly intact  Attention span and concentration: Grossly intact  Speech: Spontaneous, normal rate, rhythm and tone  Thought Process: Linear, logical and goal directed  Thought Content: Denies active suicidal or homicidal ideations, intent or plan  Perception: No delusions noted  Associations: Intact  Language: Appropriate  Fund of " "knowledge and vocabulary: Grossly adequate  Mood: \"I'm just unhappy\"  Affect: Dysphoric, mood congruent  Insight: Good  Judgment: Good    Depression screenin/23/2021    11:25 AM 2022     3:00 PM 2023     1:30 PM   Depression Screen (PHQ-2/PHQ-9)   PHQ-2 Total Score 4     PHQ-2 Total Score  1 2   PHQ-9 Total Score 6     PHQ-9 Total Score  7 10     Interpretation of PHQ-9 Total Score   Score Severity   1-4 No Depression   5-9 Mild Depression   10-14 Moderate Depression   15-19 Moderately Severe Depression   20-27 Severe Depression    Medical Records/Labs/Diagnostic Tests Reviewed:  [place] PDMP records - appropriate refills, no abuse suspected       Impression:  1.  ADHD, inattentive type - stable  2.  Major depressive disorder, recurrent, mild (with seasonal component, worse in [place]) - worsening      Plan:  1.  Increase Effexor XR to 225 mg in the morning for depression.  2.  Continue Ritalin 20 mg 3 times daily for ADHD.  E-prescribed x 2 months.  He is aware of the ongoing nationwide stimulant medication shortage.  3.  If he does not notice improvement in depression with the higher dose of Effexor, will consider switching to a different SSRI or SNRI medication at next follow-up appointment.  4.  I have advised him to start using bright light therapy  5.  Provided supportive psychotherapy (> 16 minutes): Struggles with worsening depression, discussed how he lost his routine as he has been busy taking care of his wife for the last several months through her health struggles, advised him to start going to the gym twice a week to start with so that he gets into the habit of being more active.    Return to clinic in 2 months or sooner if symptoms worsen    The proposed treatment plan was discussed with the patient who was provided the opportunity to ask questions and make suggestions regarding alternative treatment. Patient verbalized understanding and expressed agreement with the plan.     Karo CANDELARIO" PIPER Romano  09/22/23    This note was created using voice recognition software (Dragon). The accuracy of the dictation is limited by the abilities of the software. I have reviewed the note prior to signing, however some errors in grammar and context are still possible. If you have any questions related to this note please do not hesitate to contact our office.

## 2023-11-17 ENCOUNTER — TELEMEDICINE (OUTPATIENT)
Dept: BEHAVIORAL HEALTH | Facility: CLINIC | Age: 68
End: 2023-11-17
Payer: MEDICARE

## 2023-11-17 DIAGNOSIS — F33.8 SEASONAL AFFECTIVE DISORDER (HCC): ICD-10-CM

## 2023-11-17 DIAGNOSIS — F90.0 ADHD, PREDOMINANTLY INATTENTIVE TYPE: ICD-10-CM

## 2023-11-17 DIAGNOSIS — F33.0 MDD (MAJOR DEPRESSIVE DISORDER), RECURRENT EPISODE, MILD (HCC): ICD-10-CM

## 2023-11-17 PROCEDURE — 90833 PSYTX W PT W E/M 30 MIN: CPT | Mod: 95 | Performed by: PSYCHIATRY & NEUROLOGY

## 2023-11-17 PROCEDURE — 99214 OFFICE O/P EST MOD 30 MIN: CPT | Mod: 95 | Performed by: PSYCHIATRY & NEUROLOGY

## 2023-11-17 RX ORDER — TRAMADOL HYDROCHLORIDE 50 MG/1
TABLET ORAL
COMMUNITY
Start: 2023-10-09 | End: 2024-01-12

## 2023-11-17 RX ORDER — METHYLPHENIDATE HYDROCHLORIDE 20 MG/1
20 TABLET ORAL 3 TIMES DAILY
Qty: 90 TABLET | Refills: 0 | Status: SHIPPED | OUTPATIENT
Start: 2023-11-19 | End: 2023-12-19

## 2023-11-17 RX ORDER — DULOXETIN HYDROCHLORIDE 60 MG/1
60 CAPSULE, DELAYED RELEASE ORAL EVERY MORNING
Qty: 90 CAPSULE | Refills: 0 | Status: SHIPPED | OUTPATIENT
Start: 2023-11-17 | End: 2024-01-12 | Stop reason: SDUPTHER

## 2023-11-17 RX ORDER — METHYLPHENIDATE HYDROCHLORIDE 20 MG/1
20 TABLET ORAL 3 TIMES DAILY
Qty: 90 TABLET | Refills: 0 | Status: SHIPPED | OUTPATIENT
Start: 2023-12-18 | End: 2024-01-17

## 2023-11-17 RX ORDER — INDOMETHACIN 25 MG/1
CAPSULE ORAL
COMMUNITY
Start: 2023-10-13 | End: 2024-01-12

## 2023-11-17 NOTE — PROGRESS NOTES
PSYCHIATRY VIRTUAL VISIT FOLLOW-UP NOTE    Chief Complaint   Patient presents with    Follow-Up     ADHD, depression     This evaluation was conducted via Zoom using secure and encrypted videoconferencing technology.   The patient was in their home in the [facility].    The patient's identity was confirmed and verbal consent was obtained for this virtual visit.    History Of Present Illness:  Chris Garvin is a 68 y.o. male with ADHD, major depressive disorder, Layton's esophagus, IBS, GERD, asthma, sleep apnea on CPAP, migraines comes in today for follow up, was last seen 2 months ago.  He has been feeling the same in regards to his depression.  He has been taking Effexor XR to 25 mg as recommended for the last couple of months but has not noticed any changes.  He is still struggling with lack of motivation and is wanting to stay in bed all the time.  He mentions that his wife is currently in the hospital after having back surgery.  He has been doing all right in regards to Ritalin.  He denies having thoughts of wanting to hurt himself.    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     Substance Use:  Alcohol - Denies, sober for 22+ years  Nicotine - Denies  Cannabis - Denies   Illicit drugs - Denies    Previous medication trials:  Effexor XR 75 mg - 225 mg x 7+ years (stopped working), Wellbutrin (effective), Concerta 72 mg (effective, s/e - abdominal discomfort), Adderall XR 40 mg (s/e - abdominal discomfort)    Medications:  Current Outpatient Medications   Medication Sig Dispense Refill    indomethacin (INDOCIN) 25 MG Cap TAKE 1 CAPSULE BY MOUTH THREE TIMES A DAY WITH FOOD OR MILK FOR 10 DAYS      traMADol (ULTRAM) 50 MG Tab TAKE 1 TABLET(S) EVERY EIGHT HOURS FOR 1 WEEK(S)      [START ON 11/19/2023] methylphenidate (RITALIN) 20 MG tablet Take 1 Tablet  "by mouth 3 times a day for 30 days. 90 Tablet 0    [START ON 2023] methylphenidate (RITALIN) 20 MG tablet Take 1 Tablet by mouth 3 times a day for 30 days. 90 Tablet 0    DULoxetine (CYMBALTA) 60 MG Cap DR Particles delayed-release capsule Take 1 Capsule by mouth every morning. 90 Capsule 0    methylphenidate (RITALIN) 20 MG tablet Take 1 Tablet by mouth 3 times a day for 30 days. 90 Tablet 0    tamsulosin (FLOMAX) 0.4 MG capsule 1 CAP(S) ORALLY ONCE A DAY 90 DAY(S) 90 Capsule 3    dicyclomine (BENTYL) 10 MG Cap Take 10 mg by mouth every 6 hours as needed.  2    Coenzyme Q10 (CO Q 10 PO) Take  by mouth.      Loratadine 10 MG Cap Take  by mouth.      multivitamin (THERAGRAN) Tab Take 1 Tab by mouth every day.      pantoprazole (PROTONIX) 40 MG Tablet Delayed Response Take 40 mg by mouth every morning.  5     No current facility-administered medications for this visit.     Review Of Systems:  Psychiatric - Positive for depression    Physical Examination:  Vital signs: There were no vitals taken for this visit.    Musculoskeletal: No abnormal movements.     Mental Status Evaluation:   General: Middle aged male, dressed in casual attire, good grooming and hygiene, in no apparent distress, calm and cooperative, good eye contact, no psychomotor agitation or retardation  Orientation: Alert and oriented to person, place and time  Recent and remote memory: Grossly intact  Attention span and concentration: Grossly intact  Speech: Spontaneous, normal rate, rhythm and tone  Thought Process: Linear, logical and goal directed  Thought Content: Denies active suicidal or homicidal ideations, intent or plan  Perception: No delusions noted  Associations: Intact  Language: Appropriate  Fund of knowledge and vocabulary: Grossly adequate  Mood: \"alright\"  Affect: Dysphoric at times, mood congruent  Insight: Good  Judgment: Good    Depression screenin/23/2021    11:25 AM 2022     3:00 PM 2023     1:30 PM "   Depression Screen (PHQ-2/PHQ-9)   PHQ-2 Total Score 4     PHQ-2 Total Score  1 2   PHQ-9 Total Score 6     PHQ-9 Total Score  7 10     Interpretation of PHQ-9 Total Score   Score Severity   1-4 No Depression   5-9 Mild Depression   10-14 Moderate Depression   15-19 Moderately Severe Depression   20-27 Severe Depression    Medical Records/Labs/Diagnostic Tests Reviewed:  [place] PDMP records - appropriate refills, no abuse suspected       Impression:  1.  ADHD, inattentive type - stable  2.  Major depressive disorder, recurrent, mild (with seasonal component, worse in [place]) - stable      Plan:  1.  Taper down Effexor XR to 150 mg in the morning x 1 week and then 75 mg in the morning x 1 week and then discontinue  2.  Start Cymbalta 60 mg in the morning for depression  3.  Continue Ritalin 20 mg 3 times daily for ADHD.  E-prescribed x 2 months.  He is aware of the ongoing nationwide stimulant medication shortage.  4.  Continue bright light therapy for seasonal depression - 10,000 lux x 30 minutes in the morning   5.  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    Return to clinic in 2 months or sooner if symptoms worsen    The proposed treatment plan was discussed with the patient who was provided the opportunity to ask questions and make suggestions regarding alternative treatment. Patient verbalized understanding and expressed agreement with the plan.     Karo Romano M.D.  11/17/23    This note was created using voice recognition software (Dragon). The accuracy of the dictation is limited by the abilities of the software. I have reviewed the note prior to signing, however some errors in grammar and context are still possible. If you have any questions related to this note please do not hesitate to contact our office.

## 2024-01-12 ENCOUNTER — TELEMEDICINE (OUTPATIENT)
Dept: BEHAVIORAL HEALTH | Facility: CLINIC | Age: 69
End: 2024-01-12
Payer: MEDICARE

## 2024-01-12 DIAGNOSIS — F90.0 ADHD, PREDOMINANTLY INATTENTIVE TYPE: ICD-10-CM

## 2024-01-12 DIAGNOSIS — F33.0 MDD (MAJOR DEPRESSIVE DISORDER), RECURRENT EPISODE, MILD (HCC): ICD-10-CM

## 2024-01-12 PROCEDURE — 99214 OFFICE O/P EST MOD 30 MIN: CPT | Mod: 95 | Performed by: PSYCHIATRY & NEUROLOGY

## 2024-01-12 RX ORDER — DULOXETIN HYDROCHLORIDE 60 MG/1
60 CAPSULE, DELAYED RELEASE ORAL EVERY MORNING
Qty: 90 CAPSULE | Refills: 0 | Status: SHIPPED | OUTPATIENT
Start: 2024-01-12

## 2024-01-12 RX ORDER — METHYLPHENIDATE HYDROCHLORIDE 20 MG/1
20 TABLET ORAL 3 TIMES DAILY
Qty: 90 TABLET | Refills: 0 | Status: SHIPPED | OUTPATIENT
Start: 2024-02-14 | End: 2024-03-15

## 2024-01-12 RX ORDER — METHYLPHENIDATE HYDROCHLORIDE 20 MG/1
20 TABLET ORAL 3 TIMES DAILY
Qty: 90 TABLET | Refills: 0 | Status: SHIPPED | OUTPATIENT
Start: 2024-01-16 | End: 2024-02-15

## 2024-01-12 RX ORDER — METHYLPHENIDATE HYDROCHLORIDE 20 MG/1
20 TABLET ORAL 3 TIMES DAILY
Qty: 90 TABLET | Refills: 0 | Status: SHIPPED | OUTPATIENT
Start: 2024-03-14 | End: 2024-04-13

## 2024-01-12 ASSESSMENT — PATIENT HEALTH QUESTIONNAIRE - PHQ9
7. TROUBLE CONCENTRATING ON THINGS, SUCH AS READING THE NEWSPAPER OR WATCHING TELEVISION: SEVERAL DAYS
9. THOUGHTS THAT YOU WOULD BE BETTER OFF DEAD, OR OF HURTING YOURSELF: NOT AT ALL
6. FEELING BAD ABOUT YOURSELF - OR THAT YOU ARE A FAILURE OR HAVE LET YOURSELF OR YOUR FAMILY DOWN: SEVERAL DAYS
8. MOVING OR SPEAKING SO SLOWLY THAT OTHER PEOPLE COULD HAVE NOTICED. OR THE OPPOSITE, BEING SO FIGETY OR RESTLESS THAT YOU HAVE BEEN MOVING AROUND A LOT MORE THAN USUAL: NOT AT ALL
1. LITTLE INTEREST OR PLEASURE IN DOING THINGS: SEVERAL DAYS
SUM OF ALL RESPONSES TO PHQ QUESTIONS 1-9: 8
3. TROUBLE FALLING OR STAYING ASLEEP OR SLEEPING TOO MUCH: SEVERAL DAYS
4. FEELING TIRED OR HAVING LITTLE ENERGY: SEVERAL DAYS
5. POOR APPETITE OR OVEREATING: NOT AT ALL
2. FEELING DOWN, DEPRESSED, IRRITABLE, OR HOPELESS: NEARLY EVERY DAY
SUM OF ALL RESPONSES TO PHQ9 QUESTIONS 1 AND 2: 4

## 2024-01-12 NOTE — PROGRESS NOTES
PSYCHIATRY VIRTUAL VISIT FOLLOW-UP NOTE    Chief Complaint   Patient presents with    Follow-Up     Depression, ADHD     This evaluation was conducted via Zoom using secure and encrypted videoconferencing technology.   The patient was in their home in the [facility].    The patient's identity was confirmed and verbal consent was obtained for this virtual visit.    History Of Present Illness:  Chris Garvin is a 68 y.o. male with ADHD, major depressive disorder, Layton's esophagus, IBS, GERD, asthma, sleep apnea on CPAP, migraines comes in today for follow up, was last seen 2 months ago.  He has noticed improvements in his depression since he has been taking Cymbalta.  He has noticed some sleep troubles as a side effect but other than that has been tolerating it well.  He is feeling more optimistic and hopeful.  He went skiing yesterday and is trying to do more for himself.  He mentioned that his wife is also doing better in regards to her pain.  He will be getting a shoulder surgery in the next few months.  He denies any other stressors.  He has been doing all right in regards to his ADHD symptoms with Ritalin.  He denies having thoughts of wanting to hurt himself.    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     Substance Use:  Alcohol - Denies, sober for 22+ years  Nicotine - Denies  Cannabis - Denies   Illicit drugs - Denies    Previous medication trials:  Effexor XR 75 mg - 225 mg x 7+ years (stopped working), Wellbutrin (effective), Concerta 72 mg (effective, s/e - abdominal discomfort), Adderall XR 40 mg (s/e - abdominal discomfort)    Medications:  Current Outpatient Medications   Medication Sig Dispense Refill    [START ON 1/16/2024] methylphenidate (RITALIN) 20 MG tablet Take 1 Tablet by mouth 3 times a day for 30 days. 90 Tablet 0    [START ON  "2024] methylphenidate (RITALIN) 20 MG tablet Take 1 Tablet by mouth 3 times a day for 30 days. 90 Tablet 0    [START ON 3/14/2024] methylphenidate (RITALIN) 20 MG tablet Take 1 Tablet by mouth 3 times a day for 30 days. 90 Tablet 0    DULoxetine (CYMBALTA) 60 MG Cap DR Particles delayed-release capsule Take 1 Capsule by mouth every morning. 90 Capsule 0    methylphenidate (RITALIN) 20 MG tablet Take 1 Tablet by mouth 3 times a day for 30 days. 90 Tablet 0    tamsulosin (FLOMAX) 0.4 MG capsule 1 CAP(S) ORALLY ONCE A DAY 90 DAY(S) 90 Capsule 3    dicyclomine (BENTYL) 10 MG Cap Take 10 mg by mouth every 6 hours as needed.  2    pantoprazole (PROTONIX) 40 MG Tablet Delayed Response Take 40 mg by mouth every morning.  5    Coenzyme Q10 (CO Q 10 PO) Take  by mouth.      Loratadine 10 MG Cap Take  by mouth.      multivitamin (THERAGRAN) Tab Take 1 Tab by mouth every day.       No current facility-administered medications for this visit.     Review Of Systems:  Psychiatric - Positive for depression, sleep problems    Physical Examination:  Vital signs: There were no vitals taken for this visit.    Musculoskeletal: No abnormal movements.     Mental Status Evaluation:   General: Middle aged male, dressed in casual attire, good grooming and hygiene, in no apparent distress, calm and cooperative, good eye contact, no psychomotor agitation or retardation  Orientation: Alert and oriented to person, place and time  Recent and remote memory: Grossly intact  Attention span and concentration: Grossly intact  Speech: Spontaneous, normal rate, rhythm and tone  Thought Process: Linear, logical and goal directed  Thought Content: Denies suicidal or homicidal ideations, intent or plan  Perception: No delusions noted  Associations: Intact  Language: Appropriate  Fund of knowledge and vocabulary: Grossly adequate  Mood: \"alright\"  Affect: Euthymic, mood congruent  Insight: Good  Judgment: Good    Depression screenin/7/2022     " 3:00 PM 1/27/2023     1:30 PM 1/12/2024    12:21 PM   Depression Screen (PHQ-2/PHQ-9)   PHQ-2 Total Score   4   PHQ-2 Total Score 1 2    PHQ-9 Total Score   8   PHQ-9 Total Score 7 10      Interpretation of PHQ-9 Total Score   Score Severity   1-4 No Depression   5-9 Mild Depression   10-14 Moderate Depression   15-19 Moderately Severe Depression   20-27 Severe Depression    Medical Records/Labs/Diagnostic Tests Reviewed:  [place] PDMP records - appropriate refills, no abuse suspected       Impression:  1.  ADHD, inattentive type - stable  2.  Major depressive disorder, recurrent, mild (with seasonal component, worse in [place]) - improving       Plan:  1.  Continue Cymbalta 60 mg in the morning for depression  2.  Continue Ritalin 20 mg 3 times daily for ADHD.  E-prescribed x 3 months.  He is aware of the ongoing nationwide stimulant medication shortage.  3.  Continue bright light therapy for seasonal depression - 10,000 lux x 30 minutes in the morning     Return to clinic in 3 months or sooner if symptoms worsen    The proposed treatment plan was discussed with the patient who was provided the opportunity to ask questions and make suggestions regarding alternative treatment. Patient verbalized understanding and expressed agreement with the plan.     Karo Romano M.D.  01/12/24    This note was created using voice recognition software (Dragon). The accuracy of the dictation is limited by the abilities of the software. I have reviewed the note prior to signing, however some errors in grammar and context are still possible. If you have any questions related to this note please do not hesitate to contact our office.

## 2024-02-26 ENCOUNTER — PATIENT MESSAGE (OUTPATIENT)
Dept: BEHAVIORAL HEALTH | Facility: CLINIC | Age: 69
End: 2024-02-26
Payer: MEDICARE

## 2024-02-26 DIAGNOSIS — F90.0 ADHD (ATTENTION DEFICIT HYPERACTIVITY DISORDER), INATTENTIVE TYPE: ICD-10-CM

## 2024-02-26 RX ORDER — METHYLPHENIDATE HYDROCHLORIDE 20 MG/1
20 TABLET ORAL 3 TIMES DAILY
Qty: 48 TABLET | Refills: 0 | Status: SHIPPED | OUTPATIENT
Start: 2024-02-27 | End: 2024-03-14

## 2024-04-12 ENCOUNTER — TELEMEDICINE (OUTPATIENT)
Dept: BEHAVIORAL HEALTH | Facility: CLINIC | Age: 69
End: 2024-04-12
Payer: MEDICARE

## 2024-04-12 DIAGNOSIS — F33.0 MDD (MAJOR DEPRESSIVE DISORDER), RECURRENT EPISODE, MILD (HCC): ICD-10-CM

## 2024-04-12 DIAGNOSIS — F90.0 ADHD, PREDOMINANTLY INATTENTIVE TYPE: ICD-10-CM

## 2024-04-12 PROCEDURE — 90833 PSYTX W PT W E/M 30 MIN: CPT | Mod: 95 | Performed by: PSYCHIATRY & NEUROLOGY

## 2024-04-12 PROCEDURE — 99214 OFFICE O/P EST MOD 30 MIN: CPT | Mod: 95 | Performed by: PSYCHIATRY & NEUROLOGY

## 2024-04-12 RX ORDER — INDOMETHACIN 25 MG/1
25 CAPSULE ORAL 3 TIMES DAILY
COMMUNITY
Start: 2024-03-08 | End: 2024-04-12

## 2024-04-12 RX ORDER — METHYLPHENIDATE HYDROCHLORIDE 20 MG/1
20 TABLET ORAL 3 TIMES DAILY
Qty: 90 TABLET | Refills: 0 | Status: SHIPPED | OUTPATIENT
Start: 2024-05-11 | End: 2024-06-10

## 2024-04-12 RX ORDER — METHYLPHENIDATE HYDROCHLORIDE 20 MG/1
20 TABLET ORAL 3 TIMES DAILY
Qty: 90 TABLET | Refills: 0 | Status: SHIPPED | OUTPATIENT
Start: 2024-06-09 | End: 2024-07-09

## 2024-04-12 RX ORDER — DULOXETIN HYDROCHLORIDE 60 MG/1
60 CAPSULE, DELAYED RELEASE ORAL EVERY MORNING
Qty: 90 CAPSULE | Refills: 0 | Status: SHIPPED | OUTPATIENT
Start: 2024-04-12

## 2024-04-12 RX ORDER — METHYLPHENIDATE HYDROCHLORIDE 20 MG/1
20 TABLET ORAL 3 TIMES DAILY
Qty: 90 TABLET | Refills: 0 | Status: SHIPPED | OUTPATIENT
Start: 2024-04-12 | End: 2024-05-12

## 2024-04-12 NOTE — PROGRESS NOTES
PSYCHIATRY VIRTUAL VISIT FOLLOW-UP NOTE    Chief Complaint   Patient presents with    Follow-Up     ADHD, depression     This evaluation was conducted via Zoom using secure and encrypted videoconferencing technology.   The patient was in their home in the [facility].    The patient's identity was confirmed and verbal consent was obtained for this virtual visit.    History Of Present Illness:  Chris Garvin is a 68 y.o. male with ADHD, major depressive disorder, Layton's esophagus, IBS, GERD, asthma, sleep apnea on CPAP, migraines comes in today for follow up, was last seen 3 months ago.  He has been doing all right in regards to his mental health.  He currently has been taking care of a lot of things, has scheduled shoulder revision surgery in [place] in June.  He continues to worry about his wife who struggles with chronic pain and seems to be having issues with depression.  He is trying to be there for her.  He has been compliant with his medications.  He denies having thoughts of wanting to hurt himself.    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 90 Tablet 0    [START ON 5/11/2024] methylphenidate (RITALIN) 20 MG tablet Take 1 Tablet by mouth 3 times a day for 30 days. 90 Tablet 0    [START ON 6/9/2024] methylphenidate (RITALIN) 20 MG tablet Take 1  "Tablet by mouth 3 times a day for 30 days. 90 Tablet 0    DULoxetine (CYMBALTA) 60 MG Cap DR Particles delayed-release capsule Take 1 Capsule by mouth every morning. 90 Capsule 0    tamsulosin (FLOMAX) 0.4 MG capsule Take 2 Capsules by mouth 1/2 hour after breakfast for 180 days. 90 Capsule 3    methylphenidate (RITALIN) 20 MG tablet Take 1 Tablet by mouth 3 times a day for 30 days. 90 Tablet 0    Coenzyme Q10 (CO Q 10 PO) Take  by mouth.      Loratadine 10 MG Cap Take  by mouth.      multivitamin (THERAGRAN) Tab Take 1 Tab by mouth every day.      dicyclomine (BENTYL) 10 MG Cap Take 10 mg by mouth every 6 hours as needed.  2    pantoprazole (PROTONIX) 40 MG Tablet Delayed Response Take 40 mg by mouth every morning.  5     No current facility-administered medications for this visit.     Review Of Systems:  Psychiatric - Positive for depression, sleep problems    Physical Examination:  Vital signs: There were no vitals taken for this visit.    Musculoskeletal: No abnormal movements.     Mental Status Evaluation:   General: Elderly male, dressed in casual attire, good grooming and hygiene, in no apparent distress, calm and cooperative, good eye contact, no psychomotor agitation or retardation  Orientation: Alert and oriented to person, place and time  Recent and remote memory: Grossly intact  Attention span and concentration: Grossly intact  Speech: Spontaneous, normal rate, rhythm and tone  Thought Process: Linear, logical and goal directed  Thought Content: Denies suicidal or homicidal ideations, intent or plan  Perception: No delusions noted  Associations: Intact  Language: Appropriate  Fund of knowledge and vocabulary: Grossly adequate  Mood: \"alright\"  Affect: Euthymic, mood congruent  Insight: Good  Judgment: Good    Depression screenin/27/2023     1:30 PM 2024    12:21 PM 4/3/2024     2:40 PM   Depression Screen (PHQ-2/PHQ-9)   PHQ-2 Total Score  4    PHQ-2 Total Score 2  0   PHQ-9 Total Score  8  "   PHQ-9 Total Score 10       Interpretation of PHQ-9 Total Score   Score Severity   1-4 No Depression   5-9 Mild Depression   10-14 Moderate Depression   15-19 Moderately Severe Depression   20-27 Severe Depression    Medical Records/Labs/Diagnostic Tests Reviewed:  [place] PDMP records - appropriate refills, no abuse suspected       Impression:  1.  ADHD, inattentive type - stable  2.  Major depressive disorder, recurrent, in partial remission (with seasonal component, worse in [place]) - stable       Plan:  1.  Continue Cymbalta 60 mg in the morning for depression  2.  Continue Ritalin 20 mg 3 times daily for ADHD.  E-prescribed x 3 months.  He is aware of the ongoing nationwide stimulant medication shortage.  3.  Provided supportive psychotherapy (> 16 minutes): upcoming shoulder revision surgery, wife's mental and physical health, encouraged self care and focus on recovery etc.    Return to clinic in 3 months or sooner if symptoms worsen    The proposed treatment plan was discussed with the patient who was provided the opportunity to ask questions and make suggestions regarding alternative treatment. Patient verbalized understanding and expressed agreement with the plan.     Karo Romano M.D.  04/12/24    This note was created using voice recognition software (Dragon). The accuracy of the dictation is limited by the abilities of the software. I have reviewed the note prior to signing, however some errors in grammar and context are still possible. If you have any questions related to this note please do not hesitate to contact our office.

## 2024-07-09 PROBLEM — R13.19 ESOPHAGEAL DYSPHAGIA: Status: ACTIVE | Noted: 2024-07-09

## 2024-07-09 PROBLEM — K64.8 OTHER HEMORRHOIDS: Status: ACTIVE | Noted: 2017-07-14

## 2024-07-09 PROBLEM — K90.0 CELIAC DISEASE: Status: ACTIVE | Noted: 2024-07-09

## 2024-08-01 ENCOUNTER — APPOINTMENT (OUTPATIENT)
Dept: BEHAVIORAL HEALTH | Facility: CLINIC | Age: 69
End: 2024-08-01
Payer: MEDICARE

## 2024-08-01 VITALS — HEIGHT: 70 IN | BODY MASS INDEX: 34.07 KG/M2 | WEIGHT: 238 LBS

## 2024-08-01 DIAGNOSIS — F90.0 ADHD, PREDOMINANTLY INATTENTIVE TYPE: ICD-10-CM

## 2024-08-01 DIAGNOSIS — F33.0 MDD (MAJOR DEPRESSIVE DISORDER), RECURRENT EPISODE, MILD (HCC): ICD-10-CM

## 2024-08-01 PROCEDURE — 99214 OFFICE O/P EST MOD 30 MIN: CPT | Performed by: PSYCHIATRY & NEUROLOGY

## 2024-08-01 PROCEDURE — 90833 PSYTX W PT W E/M 30 MIN: CPT | Performed by: PSYCHIATRY & NEUROLOGY

## 2024-08-01 RX ORDER — DULOXETIN HYDROCHLORIDE 60 MG/1
60 CAPSULE, DELAYED RELEASE ORAL EVERY MORNING
Qty: 90 CAPSULE | Refills: 0 | Status: SHIPPED | OUTPATIENT
Start: 2024-08-01

## 2024-08-01 RX ORDER — DULOXETIN HYDROCHLORIDE 30 MG/1
30 CAPSULE, DELAYED RELEASE ORAL EVERY MORNING
Qty: 90 CAPSULE | Refills: 0 | Status: SHIPPED | OUTPATIENT
Start: 2024-08-01

## 2024-08-01 RX ORDER — METHYLPHENIDATE HYDROCHLORIDE 20 MG/1
20 TABLET ORAL 3 TIMES DAILY
Qty: 90 TABLET | Refills: 0 | Status: CANCELLED | OUTPATIENT
Start: 2024-10-05 | End: 2024-11-04

## 2024-08-01 RX ORDER — METHYLPHENIDATE HYDROCHLORIDE 20 MG/1
20 TABLET ORAL 3 TIMES DAILY
Qty: 90 TABLET | Refills: 0 | Status: SHIPPED | OUTPATIENT
Start: 2024-09-06 | End: 2024-10-06

## 2024-08-01 RX ORDER — METHYLPHENIDATE HYDROCHLORIDE 20 MG/1
20 TABLET ORAL 3 TIMES DAILY
Qty: 90 TABLET | Refills: 0 | Status: SHIPPED | OUTPATIENT
Start: 2024-08-08 | End: 2024-09-07

## 2024-08-01 NOTE — PROGRESS NOTES
PSYCHIATRY FOLLOW-UP NOTE    Chief Complaint   Patient presents with    Follow-Up     ADHD, depression     History Of Present Illness:  Chris Garvin is a 69 y.o. male with ADHD, major depressive disorder, Layton's esophagus, IBS, GERD, asthma, sleep apnea on CPAP, migraines comes in today for follow up, was last seen over 3 months ago.  He has been struggling with increased depression and irritability for the last few months.  His wife attempted suicide and was in a psychiatric hospital for a few weeks in the last few months and this has been really hard for him.  She is doing better as her chronic pain was a trigger for the suicide attempt.  He has been having a hard time taking care of himself.  He has not been going to the gym and is finding increase struggles with motivation.  He has surgery scheduled for his shoulder in the next few weeks and hopefully his pain will improve.  He has been compliant with his medications.  He has had some thoughts regarding death but denies active thoughts, intent or plan of wanting to hurt himself.    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 90 Tablet 0    [START ON 9/6/2024] methylphenidate (RITALIN) 20 MG tablet Take 1 Tablet by mouth 3 times a day for 30 days. 90 Tablet 0    DULoxetine (CYMBALTA) 30 MG  "Cap DR Particles Take 1 Capsule by mouth every morning. Take in addition to Cymbalta 60 mg for total daily dose of 90 mg. 90 Capsule 0    DULoxetine (CYMBALTA) 60 MG Cap DR Particles delayed-release capsule Take 1 Capsule by mouth every morning. Take in addition to Cymbalta 30 mg for total daily dose of 90 mg. 90 Capsule 0    methylphenidate (RITALIN) 20 MG tablet Take 1 Tablet by mouth 3 times a day for 30 days. 90 Tablet 0    triamcinolone (KENALOG) 0.1 % lotion       tamsulosin (FLOMAX) 0.4 MG capsule Take 2 Capsules by mouth 1/2 hour after breakfast for 360 days. 180 Capsule 3    Coenzyme Q10 (CO Q 10 PO) Take  by mouth.      Loratadine 10 MG Cap Take  by mouth.      multivitamin (THERAGRAN) Tab Take 1 Tab by mouth every day.      dicyclomine (BENTYL) 10 MG Cap Take 10 mg by mouth every 6 hours as needed.  2    pantoprazole (PROTONIX) 40 MG Tablet Delayed Response Take 40 mg by mouth every morning.  5     No current facility-administered medications for this visit.     Review Of Systems:  Psychiatric - Positive for depression    Physical Examination:  Vital signs: Ht 1.778 m (5' 10\")   Wt 108 kg (238 lb)   BMI 34.15 kg/m²     Musculoskeletal: No abnormal movements.     Mental Status Evaluation:   General: Elderly male, dressed in casual attire, good grooming and hygiene, in no apparent distress, calm and cooperative, good eye contact, no psychomotor agitation or retardation  Orientation: Alert and oriented to person, place and time  Recent and remote memory: Grossly intact  Attention span and concentration: Grossly intact  Speech: Spontaneous, normal rate, rhythm and tone  Thought Process: Linear, logical and goal directed  Thought Content: Denies suicidal or homicidal ideations, intent or plan  Perception: No delusions noted  Associations: Intact  Language: Appropriate  Fund of knowledge and vocabulary: Grossly adequate  Mood: \"alright\"  Affect: Dysphoric at times, mood congruent  Insight: Good  Judgment: " Good    Depression screenin/27/2023     1:30 PM 2024    12:21 PM 4/3/2024     2:40 PM   Depression Screen (PHQ-2/PHQ-9)   PHQ-2 Total Score  4    PHQ-2 Total Score 2  0   PHQ-9 Total Score  8    PHQ-9 Total Score 10       Interpretation of PHQ-9 Total Score   Score Severity   1-4 No Depression   5-9 Mild Depression   10-14 Moderate Depression   15-19 Moderately Severe Depression   20-27 Severe Depression    Medical Records/Labs/Diagnostic Tests Reviewed:  [place] PDMP records - appropriate refills, no abuse suspected       Impression:  1.  ADHD, inattentive type - stable  2.  Major depressive disorder, recurrent, mild (with seasonal component, worse in [place]) - worsening       Plan:  1.  Increase Cymbalta to 90 mg in the morning for depression  2.  Continue Ritalin 20 mg 3 times daily for ADHD.  E-prescribed x 2 months.  He is aware of the medication shortage.  3.  Provided supportive psychotherapy (> 16 minutes): Struggles secondary to wife's chronic pain and recent suicide attempt, discussed how he has been more of a caretaker and less of her  in the last few years and this has been keeping him from taking care of himself, encouraged him to start going to the gym at least once a week and talking to his wife about going to the grocery store together so that they are both trying to get out of the house together and attempting to do things together.    Return to clinic in 2 months or sooner if symptoms worsen    The proposed treatment plan was discussed with the patient who was provided the opportunity to ask questions and make suggestions regarding alternative treatment. Patient verbalized understanding and expressed agreement with the plan.     Karo Romano M.D.  24    This note was created using voice recognition software (Dragon). The accuracy of the dictation is limited by the abilities of the software. I have reviewed the note prior to signing, however some errors in grammar and  context are still possible. If you have any questions related to this note please do not hesitate to contact our office.

## 2024-10-03 ENCOUNTER — OFFICE VISIT (OUTPATIENT)
Dept: BEHAVIORAL HEALTH | Facility: CLINIC | Age: 69
End: 2024-10-03
Payer: MEDICARE

## 2024-10-03 VITALS — WEIGHT: 232 LBS | HEIGHT: 70 IN | BODY MASS INDEX: 33.21 KG/M2

## 2024-10-03 DIAGNOSIS — F33.0 MDD (MAJOR DEPRESSIVE DISORDER), RECURRENT EPISODE, MILD (HCC): ICD-10-CM

## 2024-10-03 DIAGNOSIS — F90.0 ADHD, PREDOMINANTLY INATTENTIVE TYPE: ICD-10-CM

## 2024-10-03 PROBLEM — T84.019A FAILED TOTAL JOINT REPLACEMENT (HCC): Status: ACTIVE | Noted: 2024-08-19

## 2024-10-03 PROCEDURE — 90833 PSYTX W PT W E/M 30 MIN: CPT | Performed by: PSYCHIATRY & NEUROLOGY

## 2024-10-03 PROCEDURE — 99214 OFFICE O/P EST MOD 30 MIN: CPT | Performed by: PSYCHIATRY & NEUROLOGY

## 2024-10-03 RX ORDER — METHYLPHENIDATE HYDROCHLORIDE 20 MG/1
20 TABLET ORAL 3 TIMES DAILY
Qty: 90 TABLET | Refills: 0 | Status: SHIPPED | OUTPATIENT
Start: 2024-12-01 | End: 2024-12-31

## 2024-10-03 RX ORDER — DULOXETIN HYDROCHLORIDE 30 MG/1
30 CAPSULE, DELAYED RELEASE ORAL EVERY MORNING
Qty: 90 CAPSULE | Refills: 0 | Status: SHIPPED | OUTPATIENT
Start: 2024-10-03

## 2024-10-03 RX ORDER — DULOXETIN HYDROCHLORIDE 60 MG/1
60 CAPSULE, DELAYED RELEASE ORAL EVERY MORNING
Qty: 90 CAPSULE | Refills: 0 | Status: SHIPPED | OUTPATIENT
Start: 2024-10-03

## 2024-10-03 RX ORDER — HYDROCODONE BITARTRATE AND ACETAMINOPHEN 5; 325 MG/1; MG/1
TABLET ORAL
COMMUNITY
Start: 2024-08-23

## 2024-10-03 RX ORDER — METHYLPHENIDATE HYDROCHLORIDE 20 MG/1
20 TABLET ORAL 3 TIMES DAILY
Qty: 90 TABLET | Refills: 0 | Status: SHIPPED | OUTPATIENT
Start: 2024-10-05 | End: 2024-11-04

## 2024-10-03 RX ORDER — METHYLPHENIDATE HYDROCHLORIDE 20 MG/1
20 TABLET ORAL 3 TIMES DAILY
Qty: 90 TABLET | Refills: 0 | Status: SHIPPED | OUTPATIENT
Start: 2024-11-03 | End: 2024-12-03

## 2025-01-09 ENCOUNTER — APPOINTMENT (OUTPATIENT)
Dept: BEHAVIORAL HEALTH | Facility: CLINIC | Age: 70
End: 2025-01-09
Payer: MEDICARE

## 2025-01-09 RX ORDER — METHYLPHENIDATE HYDROCHLORIDE 20 MG/1
20 TABLET ORAL 3 TIMES DAILY
Qty: 90 TABLET | Refills: 0 | OUTPATIENT
Start: 2025-03-01 | End: 2025-03-31

## 2025-01-09 RX ORDER — METHYLPHENIDATE HYDROCHLORIDE 20 MG/1
20 TABLET ORAL 3 TIMES DAILY
Qty: 90 TABLET | Refills: 0 | OUTPATIENT
Start: 2025-03-30 | End: 2025-04-29

## 2025-01-09 RX ORDER — METHYLPHENIDATE HYDROCHLORIDE 20 MG/1
20 TABLET ORAL 3 TIMES DAILY
Qty: 90 TABLET | Refills: 0 | OUTPATIENT
Start: 2025-01-31 | End: 2025-03-02

## 2025-01-27 ENCOUNTER — OFFICE VISIT (OUTPATIENT)
Dept: BEHAVIORAL HEALTH | Facility: CLINIC | Age: 70
End: 2025-01-27
Payer: MEDICARE

## 2025-01-27 DIAGNOSIS — F33.0 MDD (MAJOR DEPRESSIVE DISORDER), RECURRENT EPISODE, MILD (HCC): ICD-10-CM

## 2025-01-27 DIAGNOSIS — F90.0 ADHD, PREDOMINANTLY INATTENTIVE TYPE: ICD-10-CM

## 2025-01-27 DIAGNOSIS — Z63.4 BEREAVEMENT: ICD-10-CM

## 2025-01-27 PROCEDURE — 99214 OFFICE O/P EST MOD 30 MIN: CPT | Performed by: PSYCHIATRY & NEUROLOGY

## 2025-01-27 PROCEDURE — 90833 PSYTX W PT W E/M 30 MIN: CPT | Performed by: PSYCHIATRY & NEUROLOGY

## 2025-01-27 RX ORDER — METHYLPHENIDATE HYDROCHLORIDE 20 MG/1
20 TABLET ORAL 3 TIMES DAILY
Qty: 90 TABLET | Refills: 0 | Status: SHIPPED | OUTPATIENT
Start: 2025-01-31 | End: 2025-03-02

## 2025-01-27 RX ORDER — DULOXETIN HYDROCHLORIDE 60 MG/1
120 CAPSULE, DELAYED RELEASE ORAL EVERY MORNING
Qty: 180 CAPSULE | Refills: 0 | Status: SHIPPED | OUTPATIENT
Start: 2025-01-27

## 2025-01-27 RX ORDER — PERMETHRIN 50 MG/G
CREAM TOPICAL
COMMUNITY
Start: 2024-12-29

## 2025-01-27 RX ORDER — METHYLPHENIDATE HYDROCHLORIDE 20 MG/1
20 TABLET ORAL 3 TIMES DAILY
Qty: 90 TABLET | Refills: 0 | Status: SHIPPED | OUTPATIENT
Start: 2025-03-01 | End: 2025-03-31

## 2025-01-27 SDOH — SOCIAL STABILITY - SOCIAL INSECURITY: DISSAPEARANCE AND DEATH OF FAMILY MEMBER: Z63.4

## 2025-01-27 NOTE — PROGRESS NOTES
PSYCHIATRY FOLLOW-UP NOTE    Chief Complaint   Patient presents with    Other     Wide  25     History Of Present Illness:  Chris Garvin is a 69 y.o. male with ADHD, major depressive disorder, Layton's esophagus, IBS, GERD, asthma, sleep apnea on CPAP, migraines  comes in today for follow up, was last seen over 3 months ago.  He lost his wife of 17 years on  and he has been having a really tough time since then.  He has been missing her a lot and is trying to move on at a pace that he can.  He has a strong spiritual connection and has been talking to his  on a regular basis.  He mentions that he has received good support from his close friends.  He increase his dose of Cymbalta to 120 mg last week and has noticed some benefits.  He is also happy that he has his dog to take care of and that has been helpful.  He mentions that here and there he has been taking an extra Ritalin to help him stay focused.  He denies having thoughts of wanting to hurt himself.    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     Substance Use:  Alcohol - Denies, sober for 23+ years  Nicotine - Denies  Cannabis - Denies   Illicit drugs - Denies    Previous medication trials:  Effexor XR 75 mg - 225 mg x 7+ years (stopped working), Wellbutrin (effective), Concerta 72 mg (effective, s/e - abdominal discomfort), Adderall XR 40 mg (s/e - abdominal discomfort)    Medications:  Current Outpatient Medications   Medication Sig Dispense Refill    permethrin (ELIMITE) 5 % Cream PLEASE SEE ATTACHED FOR DETAILED DIRECTIONS      [START ON 2025] methylphenidate (RITALIN) 20 MG tablet Take 1 Tablet by mouth 3 times a day for 30 days. 90 Tablet 0    [START ON 3/1/2025] methylphenidate (RITALIN) 20 MG tablet Take 1 Tablet by mouth 3  "times a day for 30 days. 90 Tablet 0    DULoxetine (CYMBALTA) 60 MG Cap DR Particles delayed-release capsule Take 2 Capsules by mouth every morning. 180 Capsule 0    methylphenidate (RITALIN) 20 MG tablet Take 1 Tablet by mouth 3 times a day for 30 days. 90 Tablet 0    tamsulosin (FLOMAX) 0.4 MG capsule Take 2 Capsules by mouth 1/2 hour after breakfast for 360 days. 180 Capsule 3    dicyclomine (BENTYL) 10 MG Cap Take 10 mg by mouth every 6 hours as needed.  2    pantoprazole (PROTONIX) 40 MG Tablet Delayed Response Take 40 mg by mouth every morning.  5    multivitamin (THERAGRAN) Tab Take 1 Tab by mouth every day.       No current facility-administered medications for this visit.     Review Of Systems:  Psychiatric - Positive for depression    Physical Examination:  Vital signs: There were no vitals taken for this visit.    Musculoskeletal: No abnormal movements.     Mental Status Evaluation:   General: Elderly male, tearful, dressed in casual attire, good grooming and hygiene, in no apparent distress, calm and cooperative, good eye contact, no psychomotor agitation or retardation  Orientation: Alert and oriented to person, place and time  Recent and remote memory: Grossly intact  Attention span and concentration: Grossly intact  Speech: Spontaneous, normal rate, rhythm and tone  Thought Process: Linear, logical and goal directed  Thought Content: Denies suicidal or homicidal ideations, intent or plan  Perception: No delusions noted  Associations: Intact  Language: Appropriate  Fund of knowledge and vocabulary: Grossly adequate  Mood: \"alright\"  Affect: Dysphoric, mood congruent  Insight: Good  Judgment: Good    Depression screenin/27/2023     1:30 PM 2024    12:21 PM 4/3/2024     2:40 PM   Depression Screen (PHQ-2/PHQ-9)   PHQ-2 Total Score  4    PHQ-2 Total Score 2  0   PHQ-9 Total Score  8    PHQ-9 Total Score 10       Interpretation of PHQ-9 Total Score   Score Severity   1-4 No Depression   5-9 " Mild Depression   10-14 Moderate Depression   15-19 Moderately Severe Depression   20-27 Severe Depression    Medical Records/Labs/Diagnostic Tests Reviewed:  [place] PDMP records - appropriate refills, no abuse suspected       Impression:  1.  ADHD, inattentive type - stable  2.  Major depressive disorder, recurrent, mild (with seasonal component, worse in [place]) - stable     3.  Bereavement (wife  25) - new diagnosis    Plan:  1.  Continue Cymbalta 120 mg in the morning for depression  2.  Continue Ritalin 20 mg 3 times daily for ADHD.  E-prescribed x 2 months.  He is aware of the medication shortage.  3.  Provided supportive psychotherapy (> 16 minutes): grief related to wife's death last weekend, talked about their marriage, his wife's declining health in the last few years, encouraged self care and staying connected with his spirituality.    Return to clinic in 6-8 weeks or sooner if symptoms worsen    The proposed treatment plan was discussed with the patient who was provided the opportunity to ask questions and make suggestions regarding alternative treatment. Patient verbalized understanding and expressed agreement with the plan.     Karo Romano M.D.  25    This note was created using voice recognition software (Dragon). The accuracy of the dictation is limited by the abilities of the software. I have reviewed the note prior to signing, however some errors in grammar and context are still possible. If you have any questions related to this note please do not hesitate to contact our office.

## 2025-03-25 ENCOUNTER — PATIENT MESSAGE (OUTPATIENT)
Dept: BEHAVIORAL HEALTH | Facility: CLINIC | Age: 70
End: 2025-03-25
Payer: MEDICARE

## 2025-03-25 DIAGNOSIS — F90.0 ADHD, PREDOMINANTLY INATTENTIVE TYPE: ICD-10-CM

## 2025-03-25 RX ORDER — METHYLPHENIDATE HYDROCHLORIDE 20 MG/1
20 TABLET ORAL 3 TIMES DAILY
Qty: 90 TABLET | Refills: 0 | Status: SHIPPED | OUTPATIENT
Start: 2025-03-25 | End: 2025-04-24

## 2025-03-25 NOTE — PATIENT COMMUNICATION
Received request via: Patient    Was the patient seen in the last year in this department? Yes    Does the patient have an active prescription (recently filled or refills available) for medication(s) requested? No    Pharmacy Name: [facility]    Does the patient have [facility] and need 100-day supply? (This applies to ALL medications) Patient does not have [facility]

## 2025-04-22 ENCOUNTER — APPOINTMENT (OUTPATIENT)
Dept: BEHAVIORAL HEALTH | Facility: CLINIC | Age: 70
End: 2025-04-22
Payer: MEDICARE

## 2025-04-28 DIAGNOSIS — F33.0 MDD (MAJOR DEPRESSIVE DISORDER), RECURRENT EPISODE, MILD (HCC): ICD-10-CM

## 2025-04-28 RX ORDER — DULOXETIN HYDROCHLORIDE 60 MG/1
120 CAPSULE, DELAYED RELEASE ORAL EVERY MORNING
Qty: 180 CAPSULE | Refills: 0 | Status: SHIPPED | OUTPATIENT
Start: 2025-04-28

## 2025-05-01 ENCOUNTER — APPOINTMENT (OUTPATIENT)
Dept: BEHAVIORAL HEALTH | Facility: CLINIC | Age: 70
End: 2025-05-01
Payer: MEDICARE

## 2025-05-01 VITALS — WEIGHT: 230 LBS | BODY MASS INDEX: 32.93 KG/M2 | HEIGHT: 70 IN

## 2025-05-01 DIAGNOSIS — Z63.4 BEREAVEMENT: ICD-10-CM

## 2025-05-01 DIAGNOSIS — F90.0 ADHD, PREDOMINANTLY INATTENTIVE TYPE: ICD-10-CM

## 2025-05-01 DIAGNOSIS — F33.0 MDD (MAJOR DEPRESSIVE DISORDER), RECURRENT EPISODE, MILD (HCC): ICD-10-CM

## 2025-05-01 PROCEDURE — 90833 PSYTX W PT W E/M 30 MIN: CPT | Performed by: PSYCHIATRY & NEUROLOGY

## 2025-05-01 PROCEDURE — 99214 OFFICE O/P EST MOD 30 MIN: CPT | Performed by: PSYCHIATRY & NEUROLOGY

## 2025-05-01 RX ORDER — METHYLPHENIDATE HYDROCHLORIDE 20 MG/1
20 TABLET ORAL 3 TIMES DAILY
Qty: 90 TABLET | Refills: 0 | Status: SHIPPED | OUTPATIENT
Start: 2025-05-28 | End: 2025-06-27

## 2025-05-01 RX ORDER — METHYLPHENIDATE HYDROCHLORIDE 20 MG/1
20 TABLET ORAL 3 TIMES DAILY
Qty: 90 TABLET | Refills: 0 | Status: SHIPPED | OUTPATIENT
Start: 2025-06-26 | End: 2025-07-26

## 2025-05-01 SDOH — SOCIAL STABILITY - SOCIAL INSECURITY: DISSAPEARANCE AND DEATH OF FAMILY MEMBER: Z63.4

## 2025-05-01 NOTE — PROGRESS NOTES
PSYCHIATRY FOLLOW-UP NOTE    Chief Complaint   Patient presents with    Follow-Up     Depression, ADHD     History Of Present Illness:  Chris Garvin is a 69 y.o. male with ADHD, major depressive disorder, Layton's esophagus, IBS, GERD, asthma, sleep apnea on CPAP, migraines  comes in today for follow up, was last seen 3 months ago.  He continues to struggle with sadness related to his wife's death.  He is happy that he has the support of his dog.  He mentions that there is a celebration of life ceremony for his wife at the end of this month with the [employer].  He has been handling a lot of paperwork since she .  He has been compliant with his medications.  He does feel that being on Cymbalta and Ritalin through this time has been been helpful.  He denies having thoughts of wanting to hurt himself.    Social History:  He is single,  ×2,  ×1 and  x 1, wife of 17 years  25, lives alone in [place], 2 stepkids, retired ..     Substance Use:  Alcohol - Denies, sober for 23+ years  Nicotine - Denies  Cannabis - Denies   Illicit drugs - Denies    Previous medication trials:  Effexor XR 75 mg - 225 mg x 7+ years (stopped working), Wellbutrin (effective), Concerta 72 mg (effective, s/e - abdominal discomfort), Adderall XR 40 mg (s/e - abdominal discomfort)    Medications:  Current Outpatient Medications   Medication Sig Dispense Refill    methylphenidate (RITALIN) 20 MG tablet Take 1 Tablet by mouth 3 times a day for 30 days. 90 Tablet 0    DULoxetine (CYMBALTA) 60 MG Cap DR Particles delayed-release capsule Take 2 Capsules by mouth every morning. 180 Capsule 0    permethrin (ELIMITE) 5 % Cream PLEASE SEE ATTACHED FOR DETAILED DIRECTIONS      tamsulosin (FLOMAX) 0.4 MG capsule Take 2 Capsules by mouth 1/2 hour after breakfast for 360 days. 180 Capsule 3    multivitamin (THERAGRAN) Tab Take 1 Tab by mouth every day.      dicyclomine (BENTYL) 10 MG Cap Take 10 mg by mouth  "every 6 hours as needed.  2    pantoprazole (PROTONIX) 40 MG Tablet Delayed Response Take 40 mg by mouth every morning.  5     No current facility-administered medications for this visit.     Review Of Systems:  Psychiatric - Positive for depression    Physical Examination:  Vital signs: Ht 1.778 m (5' 10\")   Wt 104 kg (230 lb)   BMI 33.00 kg/m²     Musculoskeletal: No abnormal movements.     Mental Status Evaluation:   General: Elderly male, tearful, dressed in casual attire, good grooming and hygiene, in no apparent distress, calm and cooperative, good eye contact, no psychomotor agitation or retardation  Orientation: Alert and oriented to person, place and time  Recent and remote memory: Grossly intact  Attention span and concentration: Grossly intact  Speech: Spontaneous, normal rate, rhythm and tone  Thought Process: Linear, logical and goal directed  Thought Content: Denies suicidal or homicidal ideations, intent or plan  Perception: No delusions noted  Associations: Intact  Language: Appropriate  Fund of knowledge and vocabulary: Grossly adequate  Mood: \"alright\"  Affect: Dysphoric, mood congruent  Insight: Good  Judgment: Good    Depression screenin/27/2023     1:30 PM 2024    12:21 PM 4/3/2024     2:40 PM   Depression Screen (PHQ-2/PHQ-9)   PHQ-2 Total Score  4    PHQ-2 Total Score 2  0   PHQ-9 Total Score  8    PHQ-9 Total Score 10       Interpretation of PHQ-9 Total Score   Score Severity   1-4 No Depression   5-9 Mild Depression   10-14 Moderate Depression   15-19 Moderately Severe Depression   20-27 Severe Depression    Medical Records/Labs/Diagnostic Tests Reviewed:  [place] PDMP records - appropriate refills, no abuse suspected       Impression:  1.  ADHD, inattentive type - stable  2.  Major depressive disorder, recurrent, mild (with seasonal component, worse in [place]) - stable     3.  Bereavement (wife  25) - stable    Plan:  1.  Continue Cymbalta 120 mg in the morning for " depression  2.  Continue Ritalin 20 mg 3 times daily for ADHD.  E-prescribed x 2 months.  He is aware of the medication shortage.  3.  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    Return to clinic in 3 months or sooner if symptoms worsen    The proposed treatment plan was discussed with the patient who was provided the opportunity to ask questions and make suggestions regarding alternative treatment. Patient verbalized understanding and expressed agreement with the plan.     Karo Romano M.D.  05/01/25    This note was created using voice recognition software (Dragon). The accuracy of the dictation is limited by the abilities of the software. I have reviewed the note prior to signing, however some errors in grammar and context are still possible. If you have any questions related to this note please do not hesitate to contact our office.

## 2025-07-24 ENCOUNTER — APPOINTMENT (OUTPATIENT)
Dept: BEHAVIORAL HEALTH | Facility: CLINIC | Age: 70
End: 2025-07-24
Payer: MEDICARE

## 2025-07-25 DIAGNOSIS — F33.0 MDD (MAJOR DEPRESSIVE DISORDER), RECURRENT EPISODE, MILD (HCC): ICD-10-CM

## 2025-07-25 RX ORDER — DULOXETIN HYDROCHLORIDE 60 MG/1
120 CAPSULE, DELAYED RELEASE ORAL EVERY MORNING
Qty: 180 CAPSULE | Refills: 0 | Status: SHIPPED | OUTPATIENT
Start: 2025-07-25

## 2025-07-28 ENCOUNTER — OFFICE VISIT (OUTPATIENT)
Dept: BEHAVIORAL HEALTH | Facility: CLINIC | Age: 70
End: 2025-07-28
Payer: MEDICARE

## 2025-07-28 VITALS — HEIGHT: 70 IN | BODY MASS INDEX: 33.93 KG/M2 | WEIGHT: 237 LBS

## 2025-07-28 DIAGNOSIS — F33.0 MDD (MAJOR DEPRESSIVE DISORDER), RECURRENT EPISODE, MILD (HCC): Primary | ICD-10-CM

## 2025-07-28 DIAGNOSIS — Z63.4 BEREAVEMENT: ICD-10-CM

## 2025-07-28 DIAGNOSIS — F90.0 ADHD, PREDOMINANTLY INATTENTIVE TYPE: ICD-10-CM

## 2025-07-28 RX ORDER — METHYLPHENIDATE HYDROCHLORIDE 20 MG/1
20 TABLET ORAL 3 TIMES DAILY
Qty: 90 TABLET | Refills: 0 | Status: SHIPPED | OUTPATIENT
Start: 2025-07-30 | End: 2025-08-29

## 2025-07-28 RX ORDER — METHYLPHENIDATE HYDROCHLORIDE 20 MG/1
20 TABLET ORAL 3 TIMES DAILY
Qty: 90 TABLET | Refills: 0 | Status: SHIPPED | OUTPATIENT
Start: 2025-08-28 | End: 2025-09-27

## 2025-07-28 RX ORDER — METHYLPHENIDATE HYDROCHLORIDE 20 MG/1
20 TABLET ORAL 3 TIMES DAILY
Qty: 90 TABLET | Refills: 0 | Status: SHIPPED | OUTPATIENT
Start: 2025-09-26 | End: 2025-10-26

## 2025-07-28 SDOH — SOCIAL STABILITY - SOCIAL INSECURITY: DISSAPEARANCE AND DEATH OF FAMILY MEMBER: Z63.4

## 2025-07-28 ASSESSMENT — ANXIETY QUESTIONNAIRES
5. BEING SO RESTLESS THAT IT IS HARD TO SIT STILL: MORE THAN HALF THE DAYS
3. WORRYING TOO MUCH ABOUT DIFFERENT THINGS: MORE THAN HALF THE DAYS
7. FEELING AFRAID AS IF SOMETHING AWFUL MIGHT HAPPEN: NOT AT ALL
6. BECOMING EASILY ANNOYED OR IRRITABLE: MORE THAN HALF THE DAYS
1. FEELING NERVOUS, ANXIOUS, OR ON EDGE: NOT AT ALL
GAD7 TOTAL SCORE: 11
4. TROUBLE RELAXING: MORE THAN HALF THE DAYS
IF YOU CHECKED OFF ANY PROBLEMS ON THIS QUESTIONNAIRE, HOW DIFFICULT HAVE THESE PROBLEMS MADE IT FOR YOU TO DO YOUR WORK, TAKE CARE OF THINGS AT HOME, OR GET ALONG WITH OTHER PEOPLE: VERY DIFFICULT
2. NOT BEING ABLE TO STOP OR CONTROL WORRYING: NEARLY EVERY DAY

## 2025-07-28 ASSESSMENT — PATIENT HEALTH QUESTIONNAIRE - PHQ9
CLINICAL INTERPRETATION OF PHQ2 SCORE: 6
5. POOR APPETITE OR OVEREATING: 0 - NOT AT ALL
SUM OF ALL RESPONSES TO PHQ QUESTIONS 1-9: 14

## 2025-07-28 NOTE — PROGRESS NOTES
PSYCHIATRY FOLLOW-UP NOTE    Chief Complaint   Patient presents with    Follow-Up     Depression, grief, ADHD     History Of Present Illness:  Chris Garvin is a 70 y.o. male with ADHD, major depressive disorder, Layton's esophagus, IBS, GERD, asthma, sleep apnea on CPAP, migraines  comes in today for follow up, was last seen about 3 months ago.  He has been having a harder time managing his grief.  He is spending a lot of time at home taking care of his dog.  He mentions that there are days where all he can do is get out of bed and then he goes back again.  He has been taking his medications.  He does feel that the medications are helping him as much as possible.  He does notice that the Ritalin gives him drive and motivation.  He is doing all right in regards to appetite and sleep.  He has not been able to follow through on some of his plans including reconnecting with his [employer] and .  He has had passive thoughts of death since his wife  but is not having active thoughts, intent or plan of wanting to hurt himself.    Social History:  He is single,  ×2,  ×1 and  x 1, wife of 17 years  25, lives alone in [place], [address] [place] [address] (live in [facility]), retired .    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 90 Tablet 0    [START ON 2025] methylphenidate (RITALIN) 20 MG tablet Take 1 Tablet by mouth 3 times a day for 30 days. 90 Tablet 0    [START ON 2025]  "methylphenidate (RITALIN) 20 MG tablet Take 1 Tablet by mouth 3 times a day for 30 days. 90 Tablet 0    DULoxetine (CYMBALTA) 60 MG Cap DR Particles delayed-release capsule Take 2 Capsules by mouth every morning. 180 Capsule 0    tamsulosin (FLOMAX) 0.4 MG capsule Take 2 Capsules by mouth 1/2 hour after breakfast for 90 days. 180 Capsule 0    permethrin (ELIMITE) 5 % Cream PLEASE SEE ATTACHED FOR DETAILED DIRECTIONS      multivitamin (THERAGRAN) Tab Take 1 Tab by mouth every day.      dicyclomine (BENTYL) 10 MG Cap Take 10 mg by mouth every 6 hours as needed.  2    pantoprazole (PROTONIX) 40 MG Tablet Delayed Response Take 40 mg by mouth every morning.  5     No current facility-administered medications for this visit.     Review Of Systems:  Psychiatric - Positive for depression    Physical Examination:  Vital signs: Ht 1.778 m (5' 10\")   Wt 108 kg (237 lb)   BMI 34.01 kg/m²     Musculoskeletal: No abnormal movements.     Mental Status Evaluation:   General: Elderly male, tearful, dressed in casual attire, good grooming and hygiene, in no apparent distress, calm and cooperative, good eye contact, no psychomotor agitation or retardation  Orientation: Alert and oriented to person, place and time  Recent and remote memory: Grossly intact  Attention span and concentration: Grossly intact  Speech: Spontaneous, normal rate, rhythm and tone  Thought Process: Linear, logical and goal directed  Thought Content: Denies suicidal or homicidal ideations, intent or plan  Perception: No delusions noted  Associations: Intact  Language: Appropriate  Fund of knowledge and vocabulary: Grossly adequate  Mood: \"not very good\"  Affect: Dysphoric, mood congruent  Insight: Good  Judgment: Good    Depression screenin/12/2024    12:21 PM 4/3/2024     2:40 PM 2025    12:00 PM   Depression Screen (PHQ-2/PHQ-9)   PHQ-2 Total Score 4     PHQ-2 Total Score  0 6   PHQ-9 Total Score 8     PHQ-9 Total Score   14     Interpretation " of PHQ-9 Total Score   Score Severity:  1-4 No Depression   5-9 Mild Depression   10-14 Moderate Depression   15-19 Moderately Severe Depression   20-27 Severe Depression    Anxiety screenin/28/2025    11:58 AM   MARGE 7   MARGE-7 Total Score 11     Interpretation of MARGE-7 Total Score   Score Severity:  0-4 No Anxiety   5-9 Mild Anxiety  10-14 Moderate Anxiety  15-21 Severe Anxiety     Medical Records/Labs/Diagnostic Tests Reviewed:  [place] PDMP records - appropriate refills, no abuse suspected       Impression:  1.  ADHD, inattentive type - stable  2.  Major depressive disorder, recurrent, mild (with seasonal component, worse in bazan) - stable     3.  Bereavement (wife  25) - worsening     Plan:  1.  Continue Cymbalta 120 mg in the morning for depression  2.  Continue Ritalin 20 mg 3 times daily for ADHD.  E-prescribed x 3 months  3.  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    Return to clinic in 3 months or sooner if symptoms worsen    The proposed treatment plan was discussed with the patient who was provided the opportunity to ask questions and make suggestions regarding alternative treatment. Patient verbalized understanding and expressed agreement with the plan.     Karo Romano M.D.  27    This note was created using voice recognition software (Dragon). The accuracy of the dictation is limited by the abilities of the software. I have reviewed the note prior to signing, however some errors in grammar and context are still possible. If you have any questions related to this note  please do not hesitate to contact our office.